# Patient Record
Sex: MALE | Race: ASIAN | NOT HISPANIC OR LATINO | URBAN - METROPOLITAN AREA
[De-identification: names, ages, dates, MRNs, and addresses within clinical notes are randomized per-mention and may not be internally consistent; named-entity substitution may affect disease eponyms.]

---

## 2017-04-20 ENCOUNTER — OFFICE VISIT (OUTPATIENT)
Dept: FAMILY MEDICINE | Facility: CLINIC | Age: 42
End: 2017-04-20

## 2017-04-20 VITALS
HEIGHT: 62 IN | HEART RATE: 81 BPM | TEMPERATURE: 98 F | WEIGHT: 111.6 LBS | SYSTOLIC BLOOD PRESSURE: 115 MMHG | BODY MASS INDEX: 20.54 KG/M2 | DIASTOLIC BLOOD PRESSURE: 74 MMHG

## 2017-04-20 DIAGNOSIS — G83.9: Primary | ICD-10-CM

## 2017-04-20 NOTE — MR AVS SNAPSHOT
"              After Visit Summary   4/20/2017    Saw Kenny Reed    MRN: [id number]           Patient Information     Date Of Birth          1975        Visit Information        Provider Department      4/20/2017 11:20 AM Scott Ellison MD [facility]        Today's Diagnoses     Flaccid paralysis (H)    -  1       Follow-ups after your visit        Who to contact     Please call your clinic at [phone] to:    Ask questions about your health    Make or cancel appointments    Discuss your medicines    Learn about your test results    Speak to your doctor   If you have compliments or concerns about an experience at your clinic, or if you wish to file a complaint, please contact [facility] Patient Relations at [phone] or email us at [email or web address]         Additional Information About Your Visit        MyChart Information     Pocket Changet gives you secure access to your electronic health record. If you see a primary care provider, you can also send messages to your care team and make appointments. If you have questions, please call your primary care clinic.  If you do not have a primary care provider, please call [phone] and they will assist you.      Heart Buddy is an electronic gateway that provides easy, online access to your medical records. With Heart Buddy, you can request a clinic appointment, read your test results, renew a prescription or communicate with your care team.     To access your existing account, please contact your [facility] or call [phone] for assistance.        Care EveryWhere ID     This is your Care EveryWhere ID. This could be used by other organizations to access your [place] medical records  [id number]        Your Vitals Were     Pulse Temperature Height BMI (Body Mass Index)          81 98  F (36.7  C) (Oral) 5' 1.81\" (157 cm) 20.54 kg/m2         Blood Pressure from Last 3 Encounters:   04/20/17 " 115/74   12/02/16 124/80    Weight from Last 3 Encounters:   04/20/17 111 lb 9.6 oz (50.6 kg)   12/02/16 113 lb 6.4 oz (51.4 kg)              Today, you had the following     No orders found for display         Today's Medication Changes          These changes are accurate as of: 4/20/17 11:59 AM.  If you have any questions, ask your nurse or doctor.               Start taking these medicines.        Dose/Directions    order for DME   Used for:  Flaccid paralysis (H)   Started by:  Scott Ellison MD        Equipment being ordered: forearm crutches   Quantity:  1 Units   Refills:  0            Where to get your medicines      Some of these will need a paper prescription and others can be bought over the counter.  Ask your nurse if you have questions.     Bring a paper prescription for each of these medications     order for DME                Primary Care Provider Office Phone # Fax #    Scott Ellison [phone]       [facility] [address] [place] [address]        Thank you!     Thank you for choosing [facility]  for your care. Our goal is always to provide you with excellent care. Hearing back from our patients is one way we can continue to improve our services. Please take a few minutes to complete the written survey that you may receive in the mail after your visit with us. Thank you!             Your Updated Medication List - Protect others around you: Learn how to safely use, store and throw away your medicines at www.disposemymeds.org.          This list is accurate as of: 4/20/17 11:59 AM.  Always use your most recent med list.                   Brand Name Dispense Instructions for use    acetaminophen 325 MG tablet    TYLENOL    100 tablet    Take 2 tablets (650 mg) by mouth every 4 hours as needed for mild pain       order for DME     1 Units    Equipment being ordered: forearm crutches

## 2017-04-20 NOTE — PROGRESS NOTES
"       SUBJECTIVE       Saw Kenny Reed is a 42 year old  male with a PMH significant for:     Patient Active Problem List   Diagnosis     History of poliomyelitis     Flaccid paralysis (H)     History of hepatitis B virus infection conferring immunity     He presents to clinic needing forms filled out for parking disability as well as new crutches. 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 He notes that his forearm crutches are worn down and are cutting into his arms at this time. He has recently acquired a drivers permit and is able to drive with only his left leg.     PMH, Medications and Allergies were reviewed and updated as needed.        REVIEW OF SYSTEMS     CONSTITUTIONAL: NEGATIVE for fever, chills, change in weight  RESP: NEGATIVE for significant cough or SOB  CV: NEGATIVE for chest pain, palpitations or peripheral edema        OBJECTIVE     Vitals:    04/20/17 1132   BP: 115/74   BP Location: Left arm   Patient Position: Chair   Pulse: 81   Temp: 98  F (36.7  C)   TempSrc: Oral   Weight: 111 lb 9.6 oz (50.6 kg)   Height: 5' 1.81\" (157 cm)     Body mass index is 20.54 kg/(m^2).    Constitutional: Awake, alert, cooperative, no apparent distress, and appears stated age.  Lungs: No increased work of breathing, good air exchange, clear to auscultation bilaterally, no crackles or wheezing.  Cardiovascular: Regular rate and rhythm, normal S1 and S2, no S3 or S4, and no murmur noted.  Musculoskeletal: normal strength and ROM of the left LE. Right leg shows severely atrophied calf and thigh muscles. Patient able to move toes, but unable to move any other musculature in the legs. Intact distal sensation bilaterally.           ASSESSMENT AND PLAN     1. Flaccid paralysis (H)  - order for DME; Equipment being ordered: forearm crutches  Dispense: 1 Units; Refill: 0  - Filled out long-term disability parking form for the patient as well.         Follow-up " as needed.     Scott Ellison MD PGY1  [facility]

## 2017-04-20 NOTE — PROGRESS NOTES
"Preceptor attestation:  Blood pressure 115/74, pulse 81, temperature 98  F (36.7  C), temperature source Oral, height 5' 1.81\" (157 cm), weight 111 lb 9.6 oz (50.6 kg).  Patient seen and discussed with the resident.  Assessment and plan reviewed with resident and agreed upon.  Supervising physician: Rony Wilson  [facility]  "

## 2017-04-25 ENCOUNTER — COMMUNICATION - HEALTHEAST (OUTPATIENT)
Dept: FAMILY MEDICINE | Facility: CLINIC | Age: 42
End: 2017-04-25

## 2017-04-26 ENCOUNTER — OFFICE VISIT - HEALTHEAST (OUTPATIENT)
Dept: FAMILY MEDICINE | Facility: CLINIC | Age: 42
End: 2017-04-26

## 2017-04-26 DIAGNOSIS — Z28.39 IMMUNIZATION DEFICIENCY: ICD-10-CM

## 2017-04-26 DIAGNOSIS — Z23 NEED FOR VACCINATION: ICD-10-CM

## 2017-04-26 ASSESSMENT — MIFFLIN-ST. JEOR: SCORE: 1267.28

## 2017-11-21 ENCOUNTER — ALLIED HEALTH/NURSE VISIT (OUTPATIENT)
Dept: FAMILY MEDICINE | Facility: CLINIC | Age: 42
End: 2017-11-21

## 2017-11-21 DIAGNOSIS — Z23 INFLUENZA VACCINE NEEDED: Primary | ICD-10-CM

## 2017-11-21 NOTE — MR AVS SNAPSHOT
After Visit Summary   11/21/2017    Saw Kenny Reed    MRN: [id number]           Patient Information     Date Of Birth          1975        Visit Information        Provider Department      11/21/2017 1:30 PM [facility]        Today's Diagnoses     Influenza vaccine needed    -  1       Follow-ups after your visit        Who to contact     Please call your clinic at [phone] to:    Ask questions about your health    Make or cancel appointments    Discuss your medicines    Learn about your test results    Speak to your doctor   If you have compliments or concerns about an experience at your clinic, or if you wish to file a complaint, please contact [facility] Patient Relations at [phone] or email us at [email or web address]         Additional Information About Your Visit        MyChart Information     Nykaat gives you secure access to your electronic health record. If you see a primary care provider, you can also send messages to your care team and make appointments. If you have questions, please call your primary care clinic.  If you do not have a primary care provider, please call [phone] and they will assist you.      Elixserve is an electronic gateway that provides easy, online access to your medical records. With Elixserve, you can request a clinic appointment, read your test results, renew a prescription or communicate with your care team.     To access your existing account, please contact your [facility] or call [phone] for assistance.        Care EveryWhere ID     This is your Care EveryWhere ID. This could be used by other organizations to access your [place] medical records  [id number]         Blood Pressure from Last 3 Encounters:   04/20/17 115/74   12/02/16 124/80    Weight from Last 3 Encounters:   04/20/17 111 lb 9.6 oz (50.6 kg)   12/02/16 113 lb 6.4 oz (51.4 kg)               We Performed the Following     ADMIN VACCINE, INITIAL     FLU VAC QUADRIVLENT SPLIT VIRUS IM 0.5ml dosage        Primary Care Provider Office Phone # Fax #    Scott Bjorn Ellison [phone]       [facility] [address] [place] [address]        Equal Access to Services     ALEKSANDRA KAPADIA : Hadii aad ku hadasho Soomaali, waaxda luqadaha, qaybta kaalmada adeegyada, waxay donnain haybryann juan carlos muirryannnoah thomson. So [facility] [phone].    ATENCIÓN: Si habla español, tiene a tobias disposición servicios gratuitos de asistencia lingüística. Fadyame al [phone].    We comply with applicable federal civil rights laws and [place] laws. We do not discriminate on the basis of race, color, national origin, age, disability, sex, sexual orientation, or gender identity.            Thank you!     Thank you for choosing [facility]  for your care. Our goal is always to provide you with excellent care. Hearing back from our patients is one way we can continue to improve our services. Please take a few minutes to complete the written survey that you may receive in the mail after your visit with us. Thank you!             Your Updated Medication List - Protect others around you: Learn how to safely use, store and throw away your medicines at www.disposemymeds.org.          This list is accurate as of: 11/21/17  2:45 PM.  Always use your most recent med list.                   Brand Name Dispense Instructions for use Diagnosis    acetaminophen 325 MG tablet    TYLENOL    100 tablet    Take 2 tablets (650 mg) by mouth every 4 hours as needed for mild pain    Episodic tension-type headache, not intractable       order for DME     1 Units    Equipment being ordered: forearm crutches    Flaccid paralysis (H)

## 2017-11-21 NOTE — NURSING NOTE
"Injectable Influenza Immunization Documentation    1.  Has the patient received the information for the injectable influenza vaccine? YES     2. Is the patient 6 months of age or older? YES     3. Does the patient have any of the following contraindications?         Severe allergy to eggs? No     Severe allergic reaction to previous influenza vaccines? No   Severe allergy to latex? No       History of Guillain-[place] syndrome? No     Currently have a temperature greater than 100.4F? No        4.  Severely egg allergic patients should have flu vaccine eligibility assessed by an MD, RN, or pharmacist, and those who received flu vaccine should be observed for 15 min by an MD, RN, Pharmacist, Medical Technician, or member of clinic staff.\": YES    5. Latex-allergic patients should be given latex-free influenza vaccine Yes. Please reference the Vaccine latex table to determine if your clinic s product is latex-containing.       Vaccination given by Jeana Taveras CMA          "

## 2018-03-13 ENCOUNTER — OFFICE VISIT (OUTPATIENT)
Dept: FAMILY MEDICINE | Facility: CLINIC | Age: 43
End: 2018-03-13
Payer: COMMERCIAL

## 2018-03-13 VITALS
DIASTOLIC BLOOD PRESSURE: 78 MMHG | TEMPERATURE: 98 F | HEART RATE: 86 BPM | BODY MASS INDEX: 23 KG/M2 | WEIGHT: 125 LBS | OXYGEN SATURATION: 95 % | SYSTOLIC BLOOD PRESSURE: 115 MMHG

## 2018-03-13 DIAGNOSIS — R10.31 ABDOMINAL PAIN, RIGHT LOWER QUADRANT: Primary | ICD-10-CM

## 2018-03-13 DIAGNOSIS — G44.219 EPISODIC TENSION-TYPE HEADACHE, NOT INTRACTABLE: ICD-10-CM

## 2018-03-13 LAB
% GRANULOCYTES: 73.7 %G (ref 40–75)
BACTERIA: NORMAL
BILIRUBIN UR: NEGATIVE
BLOOD UR: ABNORMAL
BUN SERPL-MCNC: 15.3 MG/DL (ref 7–21)
CALCIUM SERPL-MCNC: 9.7 MG/DL (ref 8.5–10.1)
CASTS: NORMAL /LPF
CHLORIDE SERPLBLD-SCNC: 99.9 MMOL/L (ref 98–110)
CO2 SERPL-SCNC: 33 MMOL/L (ref 20–32)
CREAT SERPL-MCNC: 0.9 MG/DL (ref 0.7–1.3)
CRYSTAL URINE: NORMAL /LPF
EPITHELIAL CELLS UR: NORMAL /LPF (ref 0–2)
GFR SERPL CREATININE-BSD FRML MDRD: >90 ML/MIN/1.7 M2
GLUCOSE SERPL-MCNC: 116.7 MG'DL (ref 70–99)
GLUCOSE URINE: NEGATIVE
GRANULOCYTES #: 6.4 K/UL (ref 1.6–8.3)
HCT VFR BLD AUTO: 49.8 % (ref 40–53)
HEMOGLOBIN: 15.2 G/DL (ref 13.3–17.7)
KETONES UR QL: NEGATIVE
LEUKOCYTE ESTERASE UR: NEGATIVE
LYMPHOCYTES # BLD AUTO: 1.7 K/UL (ref 0.8–5.3)
LYMPHOCYTES NFR BLD AUTO: 19.9 %L (ref 20–48)
MCH RBC QN AUTO: 27.7 PG (ref 26.5–35)
MCHC RBC AUTO-ENTMCNC: 30.5 G/DL (ref 32–36)
MCV RBC AUTO: 90.8 FL (ref 78–100)
MID #: 0.6 K/UL (ref 0–2.2)
MID %: 6.4 %M (ref 0–20)
MUCOUS URINE: NORMAL LPF
NITRITE UR QL STRIP: NEGATIVE
PH UR STRIP: 7 [PH] (ref 5–7)
PLATELET # BLD AUTO: 162 K/UL (ref 150–450)
POTASSIUM SERPL-SCNC: 3.8 MMOL/DL (ref 3.2–4.6)
PROTEIN UR: NEGATIVE
RBC # BLD AUTO: 5.5 M/UL (ref 4.4–5.9)
RBC URINE: NORMAL /HPF
SODIUM SERPL-SCNC: 137.6 MMOL/L (ref 132–142)
SP GR UR STRIP: 1.01
UROBILINOGEN UR STRIP-ACNC: ABNORMAL
WBC # BLD AUTO: 8.7 K/UL (ref 4–11)
WBC URINE: NORMAL /HPF

## 2018-03-13 RX ORDER — ACETAMINOPHEN 325 MG/1
650 TABLET ORAL EVERY 4 HOURS PRN
Qty: 100 TABLET | Refills: 0 | Status: SHIPPED | OUTPATIENT
Start: 2018-03-13 | End: 2018-09-12

## 2018-03-13 RX ORDER — IBUPROFEN 400 MG/1
400 TABLET, FILM COATED ORAL EVERY 6 HOURS PRN
Qty: 120 TABLET | Refills: 0 | Status: SHIPPED | OUTPATIENT
Start: 2018-03-13 | End: 2019-02-22

## 2018-03-13 NOTE — PATIENT INSTRUCTIONS
Schedule CT scan  Stop in lab--blood and urine   tylenol and ibuprofen to use for pain.      CT ABDOMEN/PELVIS:  [facility]  [address]  [address]  [place] [address]  [phone]  Date:   Friday March 16, 2018  Time:   11:00 AM  Nothing to eat or drink  3 hours prior to appointment.   has been requested for this appointment.  Transportation will  at 10:00 AM  [employer]  [phone]  Given to Lilia Ruano ()  Anastasiia Luis  3/13/18
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
## 2018-03-13 NOTE — MR AVS SNAPSHOT
After Visit Summary   3/13/2018    Saw Kenny Reed    MRN: [id number]           Patient Information     Date Of Birth          1975        Visit Information        Provider Department      3/13/2018 10:40 AM Lisha Motley MD [facility]        Today's Diagnoses     Abdominal pain, right lower quadrant    -  1    Episodic tension-type headache, not intractable          Care Instructions    Schedule CT scan  Stop in lab--blood and urine   tylenol and ibuprofen to use for pain.              Follow-ups after your visit        Future tests that were ordered for you today     Open Future Orders        Priority Expected Expires Ordered    CT ABDOMEN PELVIS W CONTRAST STAT  3/13/2019 3/13/2018            Who to contact     Please call your clinic at [phone] to:    Ask questions about your health    Make or cancel appointments    Discuss your medicines    Learn about your test results    Speak to your doctor            Additional Information About Your Visit        MyChart Information     AppSlingr gives you secure access to your electronic health record. If you see a primary care provider, you can also send messages to your care team and make appointments. If you have questions, please call your primary care clinic.  If you do not have a primary care provider, please call [phone] and they will assist you.      AppSlingr is an electronic gateway that provides easy, online access to your medical records. With AppSlingr, you can request a clinic appointment, read your test results, renew a prescription or communicate with your care team.     To access your existing account, please contact your [facility] or call [phone] for assistance.        Care EveryWhere ID     This is your Care EveryWhere ID. This could be used by other organizations to access your [place] medical records  [id number]        Your Vitals Were     Pulse Temperature Pulse Oximetry BMI  (Body Mass Index)          86 98  F (36.7  C) (Oral) 95% 23 kg/m2         Blood Pressure from Last 3 Encounters:   03/13/18 115/78   04/20/17 115/74   12/02/16 124/80    Weight from Last 3 Encounters:   03/13/18 125 lb (56.7 kg)   04/20/17 111 lb 9.6 oz (50.6 kg)   12/02/16 113 lb 6.4 oz (51.4 kg)              We Performed the Following     Basic Metabolic Panel ([place])     CBC with Diff Plt ([facility])     Chlamydia/Gono Amplified ([facility])     Urinalysis, Micro If ([facility])     Urine Culture ([facility])          Today's Medication Changes          These changes are accurate as of 3/13/18 11:17 AM.  If you have any questions, ask your nurse or doctor.               Start taking these medicines.        Dose/Directions    ibuprofen 400 MG tablet   Commonly known as:  ADVIL/MOTRIN   Used for:  Abdominal pain, right lower quadrant   Started by:  Lisha Motley MD        Dose:  400 mg   Take 1 tablet (400 mg) by mouth every 6 hours as needed for moderate pain   Quantity:  120 tablet   Refills:  0            Where to get your medicines      These medications were sent to [facility] - [place], [place] - [address], [place] [address]     Phone:  [phone]     acetaminophen 325 MG tablet    ibuprofen 400 MG tablet                Primary Care Provider Office Phone # Fax #    Scott Bjorn Ellison [phone]       [facility] [address] [place] [address]        Equal Access to Services     ALEKSANDRA KAPADIA AH: Hadii afshin ku hadasho Soomaali, waaxda luqadaha, qaybta kaalmada adeegyada, tona thomson. So [facility] [phone].    ATENCIÓN: Si habla boubacar, tiene a tobias disposición servicios gratuitos de asistencia lingüística. Llame al [phone].    We comply with applicable federal civil rights laws and [place] laws. We do not discriminate on the basis of race, color, national origin, age, disability, sex, sexual orientation,  or gender identity.            Thank you!     Thank you for choosing [facility]  for your care. Our goal is always to provide you with excellent care. Hearing back from our patients is one way we can continue to improve our services. Please take a few minutes to complete the written survey that you may receive in the mail after your visit with us. Thank you!             Your Updated Medication List - Protect others around you: Learn how to safely use, store and throw away your medicines at www.disposemymeds.org.          This list is accurate as of 3/13/18 11:17 AM.  Always use your most recent med list.                   Brand Name Dispense Instructions for use Diagnosis    acetaminophen 325 MG tablet    TYLENOL    100 tablet    Take 2 tablets (650 mg) by mouth every 4 hours as needed for mild pain    Episodic tension-type headache, not intractable       ibuprofen 400 MG tablet    ADVIL/MOTRIN    120 tablet    Take 1 tablet (400 mg) by mouth every 6 hours as needed for moderate pain    Abdominal pain, right lower quadrant       order for DME     1 Units    Equipment being ordered: forearm crutches    Flaccid paralysis (H)

## 2018-03-13 NOTE — LETTER
March 16, 2018      Saw Kenny Reed  [address]   [place] [address]        Dear Saw,  Here are the rest of your lab results.  Everything looks good.  Please make sure to follow up in clinic next week to discuss your CT scan and make sure that pain is getting better.  Please call the clinic at [phone] if you have any questions.     Please see below for your test results.    Resulted Orders   CBC with Diff Plt ([facility])   Result Value Ref Range    WBC 8.7 4.0 - 11.0 K/uL    Lymphocytes # 1.7 0.8 - 5.3 K/uL    % Lymphocytes 19.9 (L) 20.0 - 48.0 %L    Mid # 0.6 0.0 - 2.2 K/uL    Mid % 6.4 0.0 - 20.0 %M    GRANULOCYTES # 6.4 1.6 - 8.3 K/uL    % Granulocytes 73.7 40.0 - 75.0 %G    RBC 5.5 4.4 - 5.9 M/uL    Hemoglobin 15.2 13.3 - 17.7 g/dL    Hematocrit 49.8 40.0 - 53.0 %    MCV 90.8 78.0 - 100.0 fL    MCH 27.7 26.5 - 35.0    MCHC 30.5 (L) 32.0 - 36.0 g/dL    Platelets 162.0 150.0 - 450.0 K/uL   Basic Metabolic Panel ([facility])   Result Value Ref Range    Urea Nitrogen 15.3 7.0 - 21.0 mg/dL    Calcium 9.7 8.5 - 10.1 mg/dL    Chloride 99.9 98.0 - 110.0 mmol/L    Carbon Dioxide 33.0 (H) 20.0 - 32.0 mmol/L    Creatinine 0.9 0.7 - 1.3 mg/dL    Glucose 116.7 (H) 70.0 - 99.0 mg'dL    Potassium 3.8 3.2 - 4.6 mmol/dL    Sodium 137.6 132.0 - 142.0 mmol/L    GFR Estimate >90 >60.0 mL/min/1.7 m2    GFR Estimate If Black >90 >60.0 mL/min/1.7 m2   Urinalysis, Micro If ([facility])   Result Value Ref Range    Specific Gravity Urine 1.015 1.005 - 1.030    pH Urine 7.0 4.5 - 8.0    Leukocyte Esterase UR Negative NEGATIVE    Nitrite Urine Negative NEGATIVE    Protein UR Negative NEGATIVE    Glucose Urine Negative NEGATIVE    Ketones Urine Negative NEGATIVE    Urobilinogen mg/dL 0.2 E.U./dL 0.2 E.U./dL    Bilirubin UR Negative NEGATIVE    Blood UR Trace-intact (A) NEGATIVE   Urine Culture ([facility])   Result Value Ref Range    Culture SEE RESULTS BELOW       Comment:      CULTURE, URINE   SOURCE: Urine, Voided   CULTURE  RESULTS:    No Growth      Narrative    Test performed by:  [facility]  [address] [place] [address]   Chlamydia/Gono Amplified ("[employer]   Result Value Ref Range    Chlamydia trac,Amplified Prb Negative Negative    N gonorrhoeae,Amplified Prb Negative Negative    Narrative    Test performed by:  [facility] [address] [place] [address]   Urine Microscopic ([facility])   Result Value Ref Range    WBC Urine None <5 /hpf    RBC Urine None <5 /hpf    Epithelial Cells UR None 0 - 2 /lpf    Mucous Urine None NONE lpf    Casts Urine None NONE /lpf    Crystal Urine None NONE /lpf    Bacteria Wet Prep None None       If you have any questions, please call the clinic to make an appointment.    Sincerely,    Lisha Motley MD

## 2018-03-14 LAB
C TRACH RRNA SPEC QL NAA+PROBE: NEGATIVE
CULTURE: NORMAL
N GONORRHOEA RRNA SPEC QL NAA+PROBE: NEGATIVE

## 2018-03-14 NOTE — PROGRESS NOTES
Saw Kenny Derek-    Your initial tests look good--no sign of infection in the blood or urine.  I see that your CT scan is not until Friday.  If you start developing worsening pain, fevers, or vomiting, please return to clinic so we can do this testing sooner.      Lisha Pickett--can you have an --or use the phone interpretation to call this patient and check in on how he is doing?  I am worried that he has appendicitis--and in that case, waiting until Friday to do the CT scan is NOT a good idea--it could easily rupture in that time.  Can you see if his pain is better/worse/same today, and if it is getting worse, have him come back in for a visit?  Thank you!

## 2018-03-15 NOTE — PROGRESS NOTES
Baldomero Reed-    Here are the rest of your lab results.  Everything looks good.  Please make sure to follow up in clinic next week to discuss your CT scan and make sure that pain is getting better.  Please call the clinic at [phone] if you have any questions.      Lisha Motley    Please send results to patient.

## 2018-03-16 ENCOUNTER — TRANSFERRED RECORDS (OUTPATIENT)
Dept: HEALTH INFORMATION MANAGEMENT | Facility: CLINIC | Age: 43
End: 2018-03-16

## 2018-03-16 ENCOUNTER — SURGERY - HEALTHEAST (OUTPATIENT)
Dept: SURGERY | Facility: HOSPITAL | Age: 43
End: 2018-03-16

## 2018-03-16 ENCOUNTER — TELEPHONE (OUTPATIENT)
Dept: FAMILY MEDICINE | Facility: CLINIC | Age: 43
End: 2018-03-16

## 2018-03-16 ENCOUNTER — ANESTHESIA - HEALTHEAST (OUTPATIENT)
Dept: SURGERY | Facility: HOSPITAL | Age: 43
End: 2018-03-16

## 2018-03-16 DIAGNOSIS — R10.31 ABDOMINAL PAIN, RIGHT LOWER QUADRANT: ICD-10-CM

## 2018-03-16 DIAGNOSIS — K35.30 ACUTE APPENDICITIS WITH LOCALIZED PERITONITIS: Primary | ICD-10-CM

## 2018-03-16 ASSESSMENT — MIFFLIN-ST. JEOR
SCORE: 1303.57
SCORE: 1344.39

## 2018-03-16 NOTE — TELEPHONE ENCOUNTER
Called [facility] informing them that patient was on his way.    Scott Ellison MD PGY2  [facility]

## 2018-03-16 NOTE — TELEPHONE ENCOUNTER
Received call from [facility] regarding Saw Kenny Reed. They informed me that patient had CT scan that was positive for acute appendicitis. I will have November call the patient to inform them of the diagnosis and the need for evaluation in the ED today regarding his appendicitis.       Scott Ellison MD PGY2  [facility]

## 2018-03-16 NOTE — TELEPHONE ENCOUNTER
Patient was called and notified.  He will ask somebody to take him to [place]. BRYANNA Herrera

## 2018-03-22 ENCOUNTER — OFFICE VISIT (OUTPATIENT)
Dept: FAMILY MEDICINE | Facility: CLINIC | Age: 43
End: 2018-03-22
Payer: COMMERCIAL

## 2018-03-22 VITALS
DIASTOLIC BLOOD PRESSURE: 83 MMHG | TEMPERATURE: 97.9 F | RESPIRATION RATE: 16 BRPM | HEART RATE: 91 BPM | OXYGEN SATURATION: 98 % | SYSTOLIC BLOOD PRESSURE: 121 MMHG

## 2018-03-22 DIAGNOSIS — Z09 POSTOPERATIVE FOLLOW-UP: Primary | ICD-10-CM

## 2018-03-22 RX ORDER — IBUPROFEN 400 MG/1
400 TABLET, FILM COATED ORAL
COMMUNITY
Start: 2018-03-13 | End: 2019-02-22

## 2018-03-22 RX ORDER — ACETAMINOPHEN 325 MG/1
TABLET ORAL
COMMUNITY
Start: 2018-03-17 | End: 2018-12-12 | Stop reason: ALTCHOICE

## 2018-03-22 RX ORDER — HYDROCODONE BITARTRATE AND ACETAMINOPHEN 5; 325 MG/1; MG/1
1-2 TABLET ORAL
COMMUNITY
Start: 2018-03-17 | End: 2019-02-22

## 2018-03-22 RX ORDER — AMOXICILLIN 250 MG
1 CAPSULE ORAL
COMMUNITY
Start: 2018-03-17 | End: 2019-02-22

## 2018-03-22 NOTE — MR AVS SNAPSHOT
After Visit Summary   3/22/2018    Saw Kenny Reed    MRN: [id number]           Patient Information     Date Of Birth          1975        Visit Information        Provider Department      3/22/2018 8:20 AM Joshua Cole MD [facility]        Today's Diagnoses     Postoperative follow-up    -  1      Care Instructions    Okay to start bathing or showering    Make appointment to see Surgeon by the end of next week              Follow-ups after your visit        Who to contact     Please call your clinic at [phone] to:    Ask questions about your health    Make or cancel appointments    Discuss your medicines    Learn about your test results    Speak to your doctor            Additional Information About Your Visit        [employer] Information     [employer] gives you secure access to your electronic health record. If you see a primary care provider, you can also send messages to your care team and make appointments. If you have questions, please call your primary care clinic.  If you do not have a primary care provider, please call [phone] and they will assist you.      [employer] is an electronic gateway that provides easy, online access to your medical records. With [employer], you can request a clinic appointment, read your test results, renew a prescription or communicate with your care team.     To access your existing account, please contact your [facility] or call [phone] for assistance.        Care EveryWhere ID     This is your Care EveryWhere ID. This could be used by other organizations to access your [place] medical records  [id number]        Your Vitals Were     Pulse Temperature Respirations Pulse Oximetry          91 97.9  F (36.6  C) (Oral) 16 98%         Blood Pressure from Last 3 Encounters:   03/22/18 121/83   03/13/18 115/78   04/20/17 115/74    Weight from Last 3 Encounters:   03/13/18 125 lb (56.7 kg)   04/20/17 111 lb 9.6 oz  (50.6 kg)   12/02/16 113 lb 6.4 oz (51.4 kg)              Today, you had the following     No orders found for display       Primary Care Provider Office Phone # Fax #    Scott Bjorn Ellison [phone]       [facility] [address] [place] [address]        Equal Access to Services     ALEKSANDRA KAPADIA : Hadii aad ku hadasho Soomaali, waaxda luqadaha, qaybta kaalmada adeegyada, waxay donnain hayaan adeeg kharash laderrickn . So [facility] [phone].    ATENCIÓN: Si habla español, tiene a tobias disposición servicios gratuitos de asistencia lingüística. Fadyame al [phone].    We comply with applicable federal civil rights laws and [place] laws. We do not discriminate on the basis of race, color, national origin, age, disability, sex, sexual orientation, or gender identity.            Thank you!     Thank you for choosing [facility]  for your care. Our goal is always to provide you with excellent care. Hearing back from our patients is one way we can continue to improve our services. Please take a few minutes to complete the written survey that you may receive in the mail after your visit with us. Thank you!             Your Updated Medication List - Protect others around you: Learn how to safely use, store and throw away your medicines at www.disposemymeds.org.          This list is accurate as of 3/22/18  8:55 AM.  Always use your most recent med list.                   Brand Name Dispense Instructions for use Diagnosis    * acetaminophen 325 MG tablet    TYLENOL    100 tablet    Take 2 tablets (650 mg) by mouth every 4 hours as needed for mild pain    Episodic tension-type headache, not intractable       * PHARBETOL 325 MG tablet   Generic drug:  acetaminophen      Take 1-2 tablets as needed for pain. Do not take this in addition to Vicodin/Norco as that also has tylenol in it.        HYDROcodone-acetaminophen 5-325 MG per tablet    NORCO     Take 1-2 tablets by mouth        * ibuprofen  400 MG tablet    ADVIL/MOTRIN    120 tablet    Take 1 tablet (400 mg) by mouth every 6 hours as needed for moderate pain    Abdominal pain, right lower quadrant       * ibuprofen 400 MG tablet    ADVIL/MOTRIN     Take 400 mg by mouth        order for DME     1 Units    Equipment being ordered: forearm crutches    Flaccid paralysis (H)       senna-docusate 8.6-50 MG per tablet    SENOKOT-S;PERICOLACE     Take 1 tablet by mouth        * Notice:  This list has 4 medication(s) that are the same as other medications prescribed for you. Read the directions carefully, and ask your doctor or other care provider to review them with you.

## 2018-03-22 NOTE — PROGRESS NOTES
SUBJECTIVE       Saw Kenny Reed is a 43 year old  male with a PMH significant for:     Patient Active Problem List   Diagnosis     History of poliomyelitis     Flaccid paralysis (H)     History of hepatitis B virus infection conferring immunity     Patient presents with:  Follow Up For: Pt is here to follow up from having his appendix removed.   Medication Reconciliation: Complete.   Patient denies fevers. Appetite returned and having no complications such as vomiting or diarrhea. Takes Percocet about 1-2x/day for pain, the most aggravating factor is ambulating (uses forearm Crutches for his condition of Polio)  3/16 [facility] underwent appendectomy and was discharged 3/17 with #20 of Percocet.     Has yet to make his 2 week follow-up with surgery.   Surgical bandages are still in tact (3 of them) and he has yet to shower/bathe since discharge.     Cecilia speaking  facilitated this visit.     PMH, Medications and Allergies were reviewed and updated as needed.    ROS: As above per HPI        OBJECTIVE     Vitals:    03/22/18 0840   BP: 121/83   BP Location: Left arm   Patient Position: Sitting   Cuff Size: Adult Regular   Pulse: 91   Resp: 16   Temp: 97.9  F (36.6  C)   TempSrc: Oral   SpO2: 98%     There is no height or weight on file to calculate BMI.    GEN: NAD, appears stated age, good hygiene,   CV: RRR no m/r/g, s1 and s2 noted  NECK: supple, no obvious masses  HEENT: EOMI, head normocephalic, sclera anicteric, trachea midline  PULM: clear bilaterally without wheezes/rhonchi/rales, non-labored  ABD: soft, non-tender, + BS in all quadrants, 3 bandages covering trocar sites without surrounding erythema and without tenderness.   NEURO: CN II-XII intact  GAIT: requires forearm crutches    LABS/IMAGING/EKG  No results found for this or any previous visit (from the past 24 hour(s)).    ASSESSMENT AND PLAN     Postoperative follow-up Appendectomy  Normal exam. Healing well and pain managed  appropriately. Okay to continue Percocet PRN for pain. Follow-up with Surgery as recommended 2 weeks from discharge.     RTC as needed      Joshua Cole MD PGY3  [facility]    Discussed with Dr. Reed Lam who agrees with the above assessment and plan.

## 2018-03-22 NOTE — NURSING NOTE
name: Lilia Ruano  Language: Cecilia  Agency: [facility]  Phone number: [phone]

## 2018-03-22 NOTE — PATIENT INSTRUCTIONS
Okay to start bathing or showering    Make appointment to see Surgeon by the end of next week      POST-OP APPOINTMENT:  [facility]  [address]  [place] [address]  [phone]  Date:   Friday March 30, 2018  Time:   10:00 AM with Dr. Hernández   has been requested for this appointment.  [facility] will  at 9:00 AM  [phone]  Given to Lilia Ruano ()  Anastasiia Luis  3/22/18

## 2018-03-22 NOTE — PROGRESS NOTES
Preceptor attestation:  Patient seen and discussed with the resident. Assessment and plan reviewed with resident and agreed upon.  Supervising physician: Reed Lam  [facility]

## 2018-03-30 ENCOUNTER — OFFICE VISIT - HEALTHEAST (OUTPATIENT)
Dept: SURGERY | Facility: CLINIC | Age: 43
End: 2018-03-30

## 2018-03-30 DIAGNOSIS — Z48.89 POSTOPERATIVE VISIT: ICD-10-CM

## 2018-03-30 ASSESSMENT — MIFFLIN-ST. JEOR: SCORE: 1344.39

## 2018-04-12 ENCOUNTER — OFFICE VISIT (OUTPATIENT)
Dept: FAMILY MEDICINE | Facility: CLINIC | Age: 43
End: 2018-04-12
Payer: COMMERCIAL

## 2018-04-12 VITALS
TEMPERATURE: 97.5 F | HEART RATE: 87 BPM | DIASTOLIC BLOOD PRESSURE: 87 MMHG | OXYGEN SATURATION: 97 % | BODY MASS INDEX: 22.08 KG/M2 | RESPIRATION RATE: 20 BRPM | SYSTOLIC BLOOD PRESSURE: 129 MMHG | WEIGHT: 120 LBS

## 2018-04-12 DIAGNOSIS — K59.00 CONSTIPATION, UNSPECIFIED CONSTIPATION TYPE: Primary | ICD-10-CM

## 2018-04-12 DIAGNOSIS — R03.0 ELEVATED BP WITHOUT DIAGNOSIS OF HYPERTENSION: ICD-10-CM

## 2018-04-12 RX ORDER — POLYETHYLENE GLYCOL 3350 17 G/17G
1 POWDER, FOR SOLUTION ORAL DAILY
Qty: 510 G | Refills: 11 | Status: SHIPPED | OUTPATIENT
Start: 2018-04-12 | End: 2019-02-22

## 2018-04-12 NOTE — NURSING NOTE
name: Lilia Ruano  Language: Cecilia  Agency: [facility]  Phone number: [phone]

## 2018-04-12 NOTE — MR AVS SNAPSHOT
After Visit Summary   4/12/2018    Saw Kenny Reed    MRN: [id number]           Patient Information     Date Of Birth          1975        Visit Information        Provider Department      4/12/2018 10:40 AM Brice Ocampo MD [facility]        Today's Diagnoses     Constipation, unspecified constipation type    -  1    Elevated BP without diagnosis of hypertension          Care Instructions    I will prescribe you a powder to take to help with your constipation. You can use 1 capful in a glass of water everyday. If it doesn't help then try.   2 capfuls    I will give you a rx for a blood pressure machine. Please rest at least 15 minutes before checking your blood pressure. The number should be 140/90    Return to clinic as needed or sooner if yours symptoms worsens          Follow-ups after your visit        Who to contact     Please call your clinic at [phone] to:    Ask questions about your health    Make or cancel appointments    Discuss your medicines    Learn about your test results    Speak to your doctor            Additional Information About Your Visit        [employer] Information     [employer] gives you secure access to your electronic health record. If you see a primary care provider, you can also send messages to your care team and make appointments. If you have questions, please call your primary care clinic.  If you do not have a primary care provider, please call [phone] and they will assist you.      [employer] is an electronic gateway that provides easy, online access to your medical records. With [employer], you can request a clinic appointment, read your test results, renew a prescription or communicate with your care team.     To access your existing account, please contact your [facility] or call [phone] for assistance.        Care EveryWhere ID     This is your Care EveryWhere ID. This could be used by other organizations to access  your [place] medical records  [id number]        Your Vitals Were     Pulse Temperature Respirations Pulse Oximetry BMI (Body Mass Index)       87 97.5  F (36.4  C) (Oral) 20 97% 22.08 kg/m2        Blood Pressure from Last 3 Encounters:   04/12/18 129/87   03/22/18 121/83   03/13/18 115/78    Weight from Last 3 Encounters:   04/12/18 120 lb (54.4 kg)   03/13/18 125 lb (56.7 kg)   04/20/17 111 lb 9.6 oz (50.6 kg)              Today, you had the following     No orders found for display       Primary Care Provider Office Phone # Fax #    Scott Bjorn Ellison [phone]       [facility] [address] [place] [address]        Equal Access to Services     ALEKSANDRA KAPADIA : Celine rios [place], waaxda luqadaha, qaybta kaalmada rodrigo, tona wright . So [facility] [phone].    ATENCIÓN: Si habla español, tiene a tobias disposición servicios gratuitos de asistencia lingüística. Elva al [phone].    We comply with applicable federal civil rights laws and [place] laws. We do not discriminate on the basis of race, color, national origin, age, disability, sex, sexual orientation, or gender identity.            Thank you!     Thank you for choosing [facility]  for your care. Our goal is always to provide you with excellent care. Hearing back from our patients is one way we can continue to improve our services. Please take a few minutes to complete the written survey that you may receive in the mail after your visit with us. Thank you!             Your Updated Medication List - Protect others around you: Learn how to safely use, store and throw away your medicines at www.disposemymeds.org.          This list is accurate as of 4/12/18 11:02 AM.  Always use your most recent med list.                   Brand Name Dispense Instructions for use Diagnosis    * acetaminophen 325 MG tablet    TYLENOL    100 tablet    Take 2 tablets (650 mg) by mouth every 4  hours as needed for mild pain    Episodic tension-type headache, not intractable       * PHARBETOL 325 MG tablet   Generic drug:  acetaminophen      Take 1-2 tablets as needed for pain. Do not take this in addition to Vicodin/Norco as that also has tylenol in it.        HYDROcodone-acetaminophen 5-325 MG per tablet    NORCO     Take 1-2 tablets by mouth        * ibuprofen 400 MG tablet    ADVIL/MOTRIN    120 tablet    Take 1 tablet (400 mg) by mouth every 6 hours as needed for moderate pain    Abdominal pain, right lower quadrant       * ibuprofen 400 MG tablet    ADVIL/MOTRIN     Take 400 mg by mouth        order for DME     1 Units    Equipment being ordered: forearm crutches    Flaccid paralysis (H)       senna-docusate 8.6-50 MG per tablet    SENOKOT-S;PERICOLACE     Take 1 tablet by mouth        * Notice:  This list has 4 medication(s) that are the same as other medications prescribed for you. Read the directions carefully, and ask your doctor or other care provider to review them with you.

## 2018-04-12 NOTE — PATIENT INSTRUCTIONS
I will prescribe you a powder to take to help with your constipation. You can use 1 capful in a glass of water everyday. If it doesn't help then try.   2 capfuls    I will give you a rx for a blood pressure machine. Please rest at least 5 minutes before checking your blood pressure. The number should be less than 140/90    Return to clinic as needed or sooner if your symptoms worsens

## 2018-04-12 NOTE — PROGRESS NOTES
Nursing Notes:   Kalin Argueta, [facility]  4/12/2018 10:52 AM  Signed   name: Lilia Ruano  Language: Cecilia  Agency: [employer]  Phone number: [phone]    Chief Complaint   Patient presents with     Constipation     x 1 week, did have a BM this morning, but it has been 3 days. Wants to start on some medication to help with this     Blood pressure 129/87, pulse 87, temperature 97.5  F (36.4  C), temperature source Oral, resp. rate 20, weight 120 lb (54.4 kg), SpO2 97 %.    Patient comes in today with 2 complaints.    The first is that he is constipated.  He develops abdominal pain associated with hard bowel movements.  He has a bowel movement every 2-3 days.  The bowel movements are hard.  He does not have blood in his stools associated with this.  He has used Senexon 8.6/50 in the past with good results.  No vomiting    He notes that he took his blood pressure recently, and the SBP was 150.  He does not have symptoms with this, including no chest pain, headache, or shortness of breath.    Objective:  This is a WN male in NAD.  Vitals are as noted above, and are wnl.  His abdomen is soft with positive bowel sounds.  There is no tenderness, guarding, or rebound.  There is no distention.    Assessment: #1 Constipation #2 elevated blood pressure without diagnosis of hypertension      Plan: The patient would like to use MiraLAX for his constipation.  I have discussed self titration with him.  For the elevated blood pressure, he will get a blood pressure monitor.  He will return to clinic with the values if they are >140 or >90    Constipation, unspecified constipation type  -     polyethylene glycol (MIRALAX) powder; Take 17 g (1 capful) by mouth daily    Elevated BP without diagnosis of hypertension  -     order for DME; Equipment being ordered: Blood pressure monitor    The patient was actively involved in the decision making process, and all the questions were answered to their satisfaction prior to leaving.   An   is present throughout the visit

## 2018-09-12 ENCOUNTER — OFFICE VISIT (OUTPATIENT)
Dept: FAMILY MEDICINE | Facility: CLINIC | Age: 43
End: 2018-09-12
Payer: COMMERCIAL

## 2018-09-12 VITALS
TEMPERATURE: 98 F | HEART RATE: 87 BPM | RESPIRATION RATE: 20 BRPM | SYSTOLIC BLOOD PRESSURE: 127 MMHG | OXYGEN SATURATION: 98 % | DIASTOLIC BLOOD PRESSURE: 85 MMHG

## 2018-09-12 DIAGNOSIS — G83.9: ICD-10-CM

## 2018-09-12 DIAGNOSIS — H57.12: ICD-10-CM

## 2018-09-12 DIAGNOSIS — H52.00 HYPERMETROPIA, UNSPECIFIED LATERALITY: Primary | ICD-10-CM

## 2018-09-12 RX ORDER — ACETAMINOPHEN 325 MG/1
650 TABLET ORAL EVERY 4 HOURS PRN
Qty: 100 TABLET | Refills: 0 | Status: SHIPPED | OUTPATIENT
Start: 2018-09-12 | End: 2018-12-12

## 2018-09-12 NOTE — NURSING NOTE
Due to patient being non-English speaking/uses sign language, an  was used for this visit. Only for face-to-face interpretation by an external agency, date and length of interpretation can be found on the scanned worksheet.     name: Baldomero Hernández  Agency: [employer]  Language: Cecilia   Telephone number: Baldomero Deni Hernández  Type of interpretation: Face-to-face, spoken    VISION:  Far vision: Right eye 10/10, Left eye 10/16, with no corrective lens  Plus lens (5 years and older who pass distance screening and do not have corrective lens):  Pass - blurred vision    Katie Argueta MA

## 2018-09-12 NOTE — PATIENT INSTRUCTIONS
Baldomero Reed,    -Referral to Eye Doctor ordered today.  -Please return to clinic if you have a new rash, fever or changes in your vision or if your symptoms worsen or do not resolve in the next 2 weeks.    Dr. Dumont    OPHTHALMOLOGY ADULT REFERRAL  September 13, 2018 at 9:10 am left a message for  Baldomero Hernández to see if there is a day/time that patient cannot go? Preferred location? Does he have/need ride to appointment? [facility]    OPHTHALMOLOGY ADULT REFERRAL  [facility]           [address] [place] [address]  [id number]    Appointment:  Wednesday September 19, 2018  Arrival Time:  3:30 pm   Provider:  Dr. Bower    Please bring a copy of your insurance card and photo ID    If you wear contacts or glasses please bring these with you to your appointment     If you cannot make this appointment, please contact [phone] to reschedule    [employer] scheduled for pickup at 2:30 pm from     [address]  [place] [address]    [employer] [place] [phone]    September 14, 2018 at 1:55 pm will relay message to  Baldomero Hernández to give to patient
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
## 2018-09-12 NOTE — MR AVS SNAPSHOT
After Visit Summary   9/12/2018    Saw Kenny Reed    MRN: [id number]           Patient Information     Date Of Birth          1975        Visit Information        Provider Department      9/12/2018 1:10 PM Jimena Dumont MD [facility]        Today's Diagnoses     Hypermetropia, unspecified laterality    -  1    Ophthalmalgia, left        Episodic tension-type headache, not intractable          Care Instructions    Saw Derek,    -Referral to Eye Doctor ordered today.  -Please return to clinic if you have a new rash, fever or changes in your vision or if your symptoms worsen or do not resolve in the next 2 weeks.    Dr. Dumont          Follow-ups after your visit        Additional Services     OPHTHALMOLOGY ADULT REFERRAL       Patient to stop at the [employer] Desk    Diagnosis/Reason for Referral: left eye pain     needed: Yes  Language: Cecilia    May leave message on voicemail: No    Referral should be tracked? Yes                  Who to contact     Please call your clinic at [phone] to:    Ask questions about your health    Make or cancel appointments    Discuss your medicines    Learn about your test results    Speak to your doctor            Additional Information About Your Visit        [employer] Information     [employer] gives you secure access to your electronic health record. If you see a primary care provider, you can also send messages to your care team and make appointments. If you have questions, please call your primary care clinic.  If you do not have a primary care provider, please call [phone] and they will assist you.      [employer] is an electronic gateway that provides easy, online access to your medical records. With [employer], you can request a clinic appointment, read your test results, renew a prescription or communicate with your care team.     To access your existing account, please contact your [facility] or call  [phone] for assistance.        Care EveryWhere ID     This is your Care EveryWhere ID. This could be used by other organizations to access your [place] medical records  [id number]        Your Vitals Were     Pulse Temperature Respirations Pulse Oximetry          87 98  F (36.7  C) (Oral) 20 98%         Blood Pressure from Last 3 Encounters:   09/12/18 127/85   04/12/18 129/87   03/22/18 121/83    Weight from Last 3 Encounters:   04/12/18 120 lb (54.4 kg)   03/13/18 125 lb (56.7 kg)   04/20/17 111 lb 9.6 oz (50.6 kg)              We Performed the Following     OPHTHALMOLOGY ADULT REFERRAL          Where to get your medicines      These medications were sent to [facility] - [place], [place] - [address], [place] [address]     Phone:  [phone]     acetaminophen 325 MG tablet          Primary Care Provider Office Phone # Fax #    Scott Bjorn Ellison [phone]       [facility] [address] [place] [address]        Equal Access to Services     ALEKSANDRA KAPADIA : Hadii aad ku hadasho Soomaali, waaxda luqadaha, qaybta kaalmada rodrigo, tona thomson. So [facility] [phone].    ATENCIÓN: Si habla español, tiene a tobias disposición servicios gratuitos de asistencia lingüística. Elva al [phone].    We comply with applicable federal civil rights laws and [place] laws. We do not discriminate on the basis of race, color, national origin, age, disability, sex, sexual orientation, or gender identity.            Thank you!     Thank you for choosing [facility]  for your care. Our goal is always to provide you with excellent care. Hearing back from our patients is one way we can continue to improve our services. Please take a few minutes to complete the written survey that you may receive in the mail after your visit with us. Thank you!             Your Updated Medication List - Protect others around you: Learn how to  safely use, store and throw away your medicines at www.disposemymeds.org.          This list is accurate as of 9/12/18  2:08 PM.  Always use your most recent med list.                   Brand Name Dispense Instructions for use Diagnosis    HYDROcodone-acetaminophen 5-325 MG per tablet    NORCO     Take 1-2 tablets by mouth        * ibuprofen 400 MG tablet    ADVIL/MOTRIN    120 tablet    Take 1 tablet (400 mg) by mouth every 6 hours as needed for moderate pain    Abdominal pain, right lower quadrant       * ibuprofen 400 MG tablet    ADVIL/MOTRIN     Take 400 mg by mouth        order for DME     1 Units    Equipment being ordered: forearm crutches    Flaccid paralysis (H)       order for DME     1 Units    Equipment being ordered: Blood pressure monitor    Elevated BP without diagnosis of hypertension       * PHARBETOL 325 MG tablet   Generic drug:  acetaminophen      Take 1-2 tablets as needed for pain. Do not take this in addition to Vicodin/Norco as that also has tylenol in it.        * acetaminophen 325 MG tablet    TYLENOL    100 tablet    Take 2 tablets (650 mg) by mouth every 4 hours as needed for mild pain    Ophthalmalgia, left       polyethylene glycol powder    MIRALAX    510 g    Take 17 g (1 capful) by mouth daily    Constipation, unspecified constipation type       senna-docusate 8.6-50 MG per tablet    SENOKOT-S;PERICOLACE     Take 1 tablet by mouth        * Notice:  This list has 4 medication(s) that are the same as other medications prescribed for you. Read the directions carefully, and ask your doctor or other care provider to review them with you.

## 2018-09-21 NOTE — PROGRESS NOTES
Preceptor Attestation:   Patient seen, evaluated and discussed with the resident. I have verified the content of the note, which accurately reflects my assessment of the patient and the plan of care.   Supervising Physician:  Yash Pedro MD

## 2018-12-12 ENCOUNTER — OFFICE VISIT (OUTPATIENT)
Dept: FAMILY MEDICINE | Facility: CLINIC | Age: 43
End: 2018-12-12
Payer: COMMERCIAL

## 2018-12-12 VITALS
SYSTOLIC BLOOD PRESSURE: 116 MMHG | OXYGEN SATURATION: 97 % | RESPIRATION RATE: 12 BRPM | TEMPERATURE: 97.4 F | BODY MASS INDEX: 23.41 KG/M2 | WEIGHT: 127.2 LBS | DIASTOLIC BLOOD PRESSURE: 74 MMHG | HEART RATE: 74 BPM

## 2018-12-12 DIAGNOSIS — R44.8 PARESTHESIA OF TONGUE: ICD-10-CM

## 2018-12-12 DIAGNOSIS — Z23 NEED FOR IMMUNIZATION AGAINST INFLUENZA: Primary | ICD-10-CM

## 2018-12-12 DIAGNOSIS — R51.9 NONINTRACTABLE EPISODIC HEADACHE, UNSPECIFIED HEADACHE TYPE: ICD-10-CM

## 2018-12-12 RX ORDER — ACETAMINOPHEN 325 MG/1
650 TABLET ORAL EVERY 4 HOURS PRN
Qty: 100 TABLET | Refills: 0 | Status: SHIPPED | OUTPATIENT
Start: 2018-12-12 | End: 2019-02-22

## 2018-12-12 NOTE — PROGRESS NOTES
Preceptor Attestation:   Patient seen, evaluated and discussed with the resident. I have verified the content of the note, which accurately reflects my assessment of the patient and the plan of care.   Supervising Physician:  Alex Putnam MD

## 2018-12-12 NOTE — PROGRESS NOTES
"  ASSESSMENT AND PLAN      Saw was seen today for headache and other.    Diagnoses and all orders for this visit:      Nonintractable episodic headache, unspecified headache type: Patient's headaches appear to be well controlled with tylenol. They are not frequent and are not disrupting the patient's quality of life at this time. Will continue with conservative measures.   -     acetaminophen (TYLENOL) 325 MG tablet; Take 2 tablets (650 mg) by mouth every 4 hours as needed for mild pain  -      : Sign Language or Oral - 38-52 minutes    Paresthesia of tongue: Patient has had this tingling as recently as September. Cranial exam is otherwise normal at this time which is reassuring. Will continue to monitor for worsening symptoms. No indication for imaging at this time.     Need for immunization against influenza  -     ADMIN VACCINE, INITIAL  -     FLU VAC QUADRIVLENT SPLIT VIRUS IM 0.5ml dosage    Follow-up in one year unless symptoms worsening.     Scott Ellison MD PGY3  [facility]                SUBJECTIVE       Saw Kenny Reed is a 43 year old  male with a PMH significant for:     Patient Active Problem List   Diagnosis     History of poliomyelitis     Flaccid paralysis (H)     History of hepatitis B virus infection conferring immunity     He presents with complaints of numbness in his tongue and tylenol.  Numbness in the left side of the tongue. It has been ongoing lasts for less than a minute. Comes on when he is working for long times on the computer. These symptoms started this year. He does not feel a lot of distress with this. It occurs once a week, or once every other week.     He's having a headache once a month or once every 2 months. \"it's not severe.\" He takes tylenol for it and it resolves. Notes it just on the left side. He describes this as a burning sensation.     No recent fevers, chills, cough, chest pain, or shortness of breath.    PMH, Medications and Allergies were reviewed " and updated as needed.            OBJECTIVE     Vitals:    12/12/18 1125   BP: 116/74   BP Location: Left arm   Patient Position: Sitting   Cuff Size: Adult Regular   Pulse: 74   Resp: 12   Temp: 97.4  F (36.3  C)   TempSrc: Oral   SpO2: 97%   Weight: 57.7 kg (127 lb 3.2 oz)     Body mass index is 23.41 kg/m .    General: Well appearing, NAD, answering questions appropriately  HEENT: Atraumatic, normocephalic. Pharynx clear, mucous membranes moist.   Neck: no lymphadenopathy  CV: S1, S2, RRR. No murmurs noted  RESP: Clear to auscultation bilaterally.   NEURO: CN II-XII intact bilaterally.     No results found for this or any previous visit (from the past 24 hour(s)).

## 2018-12-12 NOTE — NURSING NOTE
Due to patient being non-English speaking/uses sign language, an  was used for this visit. Only for face-to-face interpretation by an external agency, date and length of interpretation can be found on the scanned worksheet.       name: Baldomero Hernández  Language: Cecilia  Agency:  [employer]  Telephone number: [phone]  Type of interpretation:  Face-to-face, spoken    Baldomero Reed      1.  Has the patient received the information for the influenza vaccine? YES    2.  Does the patient have any of the following contraindications?     Allergy to eggs? No     Allergic reaction to previous influenza vaccines? No     Any other problems to previous influenza vaccines? No     Paralyzed by Guillain-[place] syndrome? No     Currently pregnant? NO     Current moderate or severe illness? No    Vaccination given by Amee Ledezma CMA .  Recorded by Amee Ledezma CMA

## 2019-02-22 ENCOUNTER — OFFICE VISIT (OUTPATIENT)
Dept: FAMILY MEDICINE | Facility: CLINIC | Age: 44
End: 2019-02-22
Payer: COMMERCIAL

## 2019-02-22 VITALS
SYSTOLIC BLOOD PRESSURE: 128 MMHG | OXYGEN SATURATION: 98 % | TEMPERATURE: 97.9 F | HEART RATE: 79 BPM | DIASTOLIC BLOOD PRESSURE: 82 MMHG | BODY MASS INDEX: 24.11 KG/M2 | RESPIRATION RATE: 16 BRPM | WEIGHT: 131 LBS

## 2019-02-22 DIAGNOSIS — R51.9 NONINTRACTABLE EPISODIC HEADACHE, UNSPECIFIED HEADACHE TYPE: ICD-10-CM

## 2019-02-22 PROBLEM — K37 APPENDICITIS: Status: ACTIVE | Noted: 2018-03-16

## 2019-02-22 PROBLEM — K35.30 ACUTE APPENDICITIS WITH LOCALIZED PERITONITIS: Status: ACTIVE | Noted: 2019-02-22

## 2019-02-22 RX ORDER — ACETAMINOPHEN 325 MG/1
650 TABLET ORAL EVERY 4 HOURS PRN
Qty: 100 TABLET | Refills: 0 | Status: SHIPPED | OUTPATIENT
Start: 2019-02-22 | End: 2023-09-14

## 2019-02-22 NOTE — NURSING NOTE
Due to patient being non-English speaking/uses sign language, an  was used for this visit. Only for face-to-face interpretation by an external agency, date and length of interpretation can be found on the scanned worksheet.       name: Baldomero Hernández  Language: Cecilia  Agency:  [employer]  Telephone number: [phone]  Type of interpretation:  Face-to-face, spoken

## 2019-02-22 NOTE — PATIENT INSTRUCTIONS
Thank you for coming to [facility] for your care. It was a pleasure to take care of you!    - Great job on taking good care of your health, KEEP IT UP!  - Take Tylenol as needed headaches.   - Continue to drink lots of water.   - Schedule a follow up visit for full physical in one week.     Elle Mcfarland MD

## 2019-02-22 NOTE — PROGRESS NOTES
SUBJECTIVE       Saw Kenny Reed is a 44 year old male with a PMH significant for   Patient Active Problem List   Diagnosis     History of poliomyelitis     Flaccid paralysis (H)     History of hepatitis B virus infection conferring immunity     Acute appendicitis with localized peritonitis     Appendicitis    who presents for evaluation of headache.    Headache  Patient comes in today for follow-up of headache that he had about 2-3 days ago.  At that time, he also reports history of cold symptoms including runny nose, sneezing, congestion.  Denies any cough at that time.  Headache was bitemporal without radiation to his neck.  Denies any other associated symptoms including blurry vision, photophobia, phonophobia, numbness, tingling, weakness that is new.  He does have poliomyelitis and has known flaccid paralysis secondary to this.  Patient denies any recent fevers or chills.  Last time he had his headache was about 2 days ago.  Rated it as being mild 3/10 on the pain scale.  He has been taking Tylenol with good improvement.  Has been drinking lots of water as well.    Disability form  Patient also brings in his form to update disability status today.  This was a form that patient was supposed to feel but needed some help with this.  Patient is currently going to some adult classes learning English and math but is unable to tell me the name of the school.  There was a question on the form that asked about patient's ability to work.  Currently, patient does not feel like he can work as he has his disability from polio.  Did discuss that he would need to see the same provider a few time may be his PCP to establish care.    Patient speaks Cecilia, so an  was used.    Medications and problem list have been reviewed and updated.         REVIEW OF SYSTEMS   General: No fevers, chills  Head: +ve for headaches, no dizziness.   Neck: No swallowing problems   Resp: No cough. Mild congestion.   GI: No  constipation, diarrhea, no nausea or vomiting  Skin: No rash        OBJECTIVE     Vitals:    02/22/19 0811   BP: 128/82   Pulse: 79   Resp: 16   Temp: 97.9  F (36.6  C)   TempSrc: Oral   SpO2: 98%   Weight: 59.4 kg (131 lb)     Body mass index is 24.11 kg/m .  Gen:  In NAD, good color, appears well hydrated  HEENT: No Scleral icterus or conjunctival injection  Neck: supple without lymphadenopathy  CV:  RRR  - no murmurs, age appropriate rate  Pulm:  CTAB, no wheezes/rales/rhonchi, good air entry   Abdomen: Did not assess.  Skin: No rashes or lesions noted.    ASSESSMENT AND PLAN     Saw was seen today for headache.    Diagnoses and all orders for this visit:    Nonintractable episodic headache, unspecified headache type  Patient coming in today for a non-intractable episodic headache, likely related to his recent URI symptoms.  Currently resolved and has had good relief from using Tylenol intermittently.  Recommended that he continue to use this as needed and let us know if there are any changes in his symptoms.  Did recommend that he continue to drink lots of water.  -     acetaminophen (TYLENOL) 325 MG tablet; Take 2 tablets (650 mg) by mouth every 4 hours as needed for mild pain    Disability forms  Patient brought with him forms that were supposed to be filled by patient for updating his disability status to continue getting benefits. Patient is unsure if he is able to work or not with his disabilities Since this is my first time meeting with patient, I did recommend that he try to establish further care with us so that we can get him connected to a provider who can get to know him more.  Has only been seen by his PCP one time.  He will plan to do this in the future.  Did recommend that patient make an appointment for full physical in 1 week.    Options for treatment and/or follow-up care were reviewed with the patient who was engaged and actively involved in the decision making process and verbalized  understanding of the options discussed and was satisfied with the final plan. Risks and benefits of plan were carefully reviewed.     I, Elle Mcfarland, have discussed patient findings with attending physician Raegan Easton MD who was agreeable with plan.     Elle Mcfarland, PGY2

## 2019-02-22 NOTE — PROGRESS NOTES
Preceptor attestation:  Vital signs reviewed: /82   Pulse 79   Temp 97.9  F (36.6  C) (Oral)   Resp 16   Wt 59.4 kg (131 lb)   SpO2 98%   BMI 24.11 kg/m      Patient seen, evaluated, and discussed with the resident.  I have verified the content of the note, which accurately reflects my assessment of the patient and the plan of care.    Supervising physician: Raegan Easton MD  [facility]

## 2019-03-06 ENCOUNTER — OFFICE VISIT (OUTPATIENT)
Dept: FAMILY MEDICINE | Facility: CLINIC | Age: 44
End: 2019-03-06
Payer: COMMERCIAL

## 2019-03-06 VITALS
DIASTOLIC BLOOD PRESSURE: 82 MMHG | OXYGEN SATURATION: 96 % | RESPIRATION RATE: 18 BRPM | SYSTOLIC BLOOD PRESSURE: 127 MMHG | WEIGHT: 132 LBS | TEMPERATURE: 97.5 F | BODY MASS INDEX: 24.29 KG/M2 | HEART RATE: 93 BPM

## 2019-03-06 DIAGNOSIS — Z00.00 ROUTINE GENERAL MEDICAL EXAMINATION AT A HEALTH CARE FACILITY: Primary | ICD-10-CM

## 2019-03-06 LAB
CHOLEST SERPL-MCNC: 237.9 MG/DL (ref 0–200)
CHOLEST/HDLC SERPL: 4.8 {RATIO} (ref 0–5)
HDLC SERPL-MCNC: 49.2 MG/DL
HIV 1+2 AB+HIV1 P24 AG SERPL QL IA: NEGATIVE
LDLC SERPL CALC-MCNC: 166 MG/DL (ref 0–129)
TRIGL SERPL-MCNC: 115.6 MG/DL (ref 0–150)
VLDL CHOLESTEROL: 23.1 MG/DL (ref 7–32)

## 2019-03-06 NOTE — RESULT ENCOUNTER NOTE
Please have  call patient regarding the following:    Hi Saw Win,    The results of your cholesterol testing are back. Your total cholesterol is slightly elevated, but not high enough to need to start you on any additional medications at this time. Based on your cholesterol levels and your other vital signs, your risk for a heart attack or stroke is only 2% over the next 10 years which is very low. If you have any questions, please call the clinic.    Thank you,    Scott Ellison MD PGY3  [facility]

## 2019-03-06 NOTE — PROGRESS NOTES
Preceptor Attestation:   Patient seen, evaluated and discussed with the resident. I have verified the content of the note, which accurately reflects my assessment of the patient and the plan of care.   Supervising Physician:  Brice Ocampo MD

## 2019-03-06 NOTE — NURSING NOTE
Due to patient being non-English speaking/uses sign language, an  was used for this visit. Only for face-to-face interpretation by an external agency, date and length of interpretation can be found on the scanned worksheet.       name: Baldomero Hernández  Language: Cecilia  Agency:  [employer]  Telephone number: [phone]  Type of interpretation:  Face-to-face, spoken

## 2019-03-06 NOTE — PROGRESS NOTES
Male Physical Note      Concerns today: No special concerns today.    A [employer]  was used for  this visit.     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    Social History     Tobacco Use     Smoking status: Never Smoker     Smokeless tobacco: Never Used   Substance Use Topics     Alcohol use: No       Children ? Yes, one 12 year old son.    Has anyone hurt you physically, for example by pushing, hitting, slapping or kicking you or forcing you to have sex? Denies  Do you feel threatened or controlled by a partner, ex-partner or anyone in your life? Denies    RISK BEHAVIORS AND HEALTHY HABITS:  Tobacco Use/Smoking: Details Previously chewed betelnut  Illicit Drug Use: None  ETOH: None  Do you use alcohol? No  Sexually Active: Yes  Diet (5-7 servings of fruits/veg daily): Yes   Exercise (30 min accumulated most days):No  Dental Care: Yes   Calcium 1500 mg/d:  No  Seat Belt Use: Yes     Cholesterol Level (>46 yo or at risk):  Recommended and patient accepted testing. and HIV screening:  Recommended and patient accepted testing.      Immunization History   Administered  Date(s) Administered     Influenza Vaccine, 3 YRS +, IM (QUADRIVALENT W/PRESERVATIVES) 11/21/2017, 12/12/2018     Td (Adult), Adsorbed 04/26/2017     Reviewed Immunization Record Today    EXAMINATION:  /82 (BP Location: Right arm, Patient Position: Sitting, Cuff Size: Adult Regular)   Pulse 93   Temp 97.5  F (36.4  C) (Oral)   Resp 18   Wt 59.9 kg (132 lb)   SpO2 96%   BMI 24.29 kg/m    GENERAL: healthy, alert and no distress  EYES: Eyes grossly normal to inspection, extraocular movements - intact, and PERRL  HENT: ear canals- normal; TMs- normal; Nose- normal; Mouth- no ulcers, no lesions  NECK: no tenderness, no adenopathy, no asymmetry, no masses, no stiffness; thyroid- normal to palpation  RESP: lungs clear to auscultation - no rales, no rhonchi, no wheezes  CV: regular rates and rhythm, normal S1 S2, no S3 or S4 and no murmur, no click or rub   ABDOMEN: soft, no tenderness, no  hepatosplenomegaly, no masses, normal bowel sounds  MS: severe atrophy of right lower extremity, all other extremities have normal muscle mass and 5/5 strength.   SKIN: Signficant scarring of the left face.  NEURO: strength and tone- normal, sensory exam- grossly normal, mentation- intact, speech- normal, reflexes- symmetric  BACK: no CVA tenderness, no paralumbar tenderness  PSYCH: Alert and oriented times 3; speech- coherent , normal rate and volume; able to articulate logical thoughts, able to abstract reason, no tangential thoughts, no hallucinations or delusions, affect- normal  LYMPHATICS: ant. cervical- normal, post. cervical- normal    ASSESSMENT:  1. Health Care Maintenance: Normal Physical Exam  2. 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   2. HIV screening  3. Follow-up in 2 weeks with immunization records, if incomplete, will order titers or vaccinate at that time.

## 2019-04-04 ENCOUNTER — ALLIED HEALTH/NURSE VISIT (OUTPATIENT)
Dept: FAMILY MEDICINE | Facility: CLINIC | Age: 44
End: 2019-04-04
Payer: COMMERCIAL

## 2019-04-04 VITALS
DIASTOLIC BLOOD PRESSURE: 75 MMHG | TEMPERATURE: 97.6 F | RESPIRATION RATE: 16 BRPM | HEART RATE: 81 BPM | OXYGEN SATURATION: 99 % | SYSTOLIC BLOOD PRESSURE: 125 MMHG

## 2019-04-04 DIAGNOSIS — Z23 NEED FOR VACCINATION: Primary | ICD-10-CM

## 2019-04-04 NOTE — NURSING NOTE
Due to patient being non-English speaking/uses sign language, an  was used for this visit. Only for face-to-face interpretation by an external agency, date and length of interpretation can be found on the scanned worksheet.     name: Ignacio Pierre  Agency: [employer]  Language: Cecilia   Telephone number:   Type of interpretation: Face-to-face, spoken

## 2020-01-06 ENCOUNTER — ALLIED HEALTH/NURSE VISIT (OUTPATIENT)
Dept: FAMILY MEDICINE | Facility: CLINIC | Age: 45
End: 2020-01-06
Payer: COMMERCIAL

## 2020-01-06 VITALS — TEMPERATURE: 97.6 F

## 2020-01-06 DIAGNOSIS — Z23 NEED FOR PROPHYLACTIC VACCINATION AND INOCULATION AGAINST INFLUENZA: Primary | ICD-10-CM

## 2020-01-06 NOTE — NURSING NOTE
Chief Complaint   Patient presents with     Flu Shot     FLU VACCINE      Todd Catherine CMA    Due to patient being non-English speaking/uses sign language, an  was used for this visit. Only for face-to-face interpretation by an external agency, date and length of interpretation can be found on the scanned worksheet.     name: Baldomero Hernández  Agency: [employer]  Language: Cecilia   Telephone number: [phone]  Type of interpretation: Face-to-face, spoken     EVENS Archuleta      1.  Has the patient received the information for the influenza vaccine? YES    2.  Does the patient have any of the following contraindications?     Allergy to eggs? No     Allergic reaction to previous influenza vaccines? No     Any other problems to previous influenza vaccines? No     Paralyzed by Guillain-Talala syndrome? No     Currently pregnant? NO     Current moderate or severe illness? No    Vaccination given by Todd Catherine CMA    Recorded by Todd Catherine CMA

## 2020-05-07 ENCOUNTER — TELEPHONE (OUTPATIENT)
Dept: FAMILY MEDICINE | Facility: CLINIC | Age: 45
End: 2020-05-07

## 2020-05-07 NOTE — TELEPHONE ENCOUNTER
Reached out to patient during COVID19 Clinic outreach. Reassured patient that [facility] is still open and has started implementing phone and video appointments to help patient remain safe at home.     Patient reports the following concerns: n/a    Per patient request, patient is scheduled for a visit to address their concerns on the following date: n/a     Offered MyChart. Patient declined. Unable to call.    Called, no answer and sent a letter.     BRYANNA Mckinley        Due to patient being non-English speaking/uses sign language, an  was used for this visit. Only for face-to-face interpretation by an external agency, date and length of interpretation can be found on the scanned worksheet.     name: Kathy Mcintyre  Agency: [employer]  Language: Cecilia   Telephone number: [phone]  Type of interpretation: Telephone, spoken

## 2020-05-07 NOTE — LETTER
May 7, 2020      Saw Kenny Derek  [address]   [place] [address]        Dear Saw,    We tried reaching you by phone but were unable to connect with you. We are reaching out to see how you are doing. This is a very stressful time in the world, which can cause an increase in personal stress and anxiety.     Our clinic is open. We are here for you and are ready to meet all of your healthcare needs. We have delayed preventive care until July. We want everyone who can to stay home during this time for their health and the health of all. We are now having most visits over the phone, but will see people in person if your doctor agrees that it is necessary. We also will have video visits starting on April 6, 2020.      Call us with any questions or concerns you may have, and know that we are all in this together.       Sincerely,     Your team at [facility]  [phone]

## 2020-12-27 ENCOUNTER — HEALTH MAINTENANCE LETTER (OUTPATIENT)
Age: 45
End: 2020-12-27

## 2021-05-30 VITALS — WEIGHT: 112 LBS | BODY MASS INDEX: 20.61 KG/M2 | HEIGHT: 62 IN

## 2021-06-01 VITALS — HEIGHT: 62 IN | BODY MASS INDEX: 23.74 KG/M2 | WEIGHT: 129 LBS

## 2021-06-01 VITALS — WEIGHT: 129 LBS | HEIGHT: 62 IN | BODY MASS INDEX: 23.74 KG/M2

## 2021-06-10 NOTE — PROGRESS NOTES
"S:  Patient seen in clinic today for green card exam and update of immunizations.  There is no past history of immunization reactions.  No recent fevers or shortness of breath.     There is no problem list on file for this patient.      O:  /64 (Patient Site: Right Arm, Patient Position: Sitting, Cuff Size: Adult Regular)  Pulse 72  Temp 98.5  F (36.9  C) (Oral)   Resp 20  Ht 5' 2\" (1.575 m)  Wt 112 lb (50.8 kg)  BMI 20.49 kg/m2  General - alert, NAD  CV - RRR  Resp - lunds clear to auscultation    A/P:  Green Card/update imm.  Counseling done on immunization history and requirements with the patient.  Reviewed available immunization history and relevant lab records with patient and family.  Immunizations given as documented in the EHR and on form.  Acetaminophen as needed for post-immunization fever or myalgias.  [employer] form I-693 completed today with the patient.  Given to patient in a sealed labeled envelope and a copy is being scanned into the EHR.  Please see that form for further details.  Patient directed to follow-up in clinic for routine and preventative care.     "

## 2021-06-16 NOTE — ANESTHESIA PREPROCEDURE EVALUATION
Anesthesia Evaluation      Patient summary reviewed   No history of anesthetic complications     Airway   Mallampati: II  Neck ROM: full   Pulmonary - negative ROS and normal exam    breath sounds clear to auscultation                         Cardiovascular - negative ROS  Rhythm: regular  Rate: normal,         Neuro/Psych      Comments: Weak right leg ?polio    Endo/Other - negative ROS      GI/Hepatic/Renal      Comments: Acute appendicitis     Other findings: Pt ate a few bites of soup and rice at 1430. Discussed w/ Dr Hernández our preference to wait 8 hrs for NPO status. Per Dr Hernández, pt's condition merits doing appendectomy emergently.      Dental    (+) poor dentition                       Anesthesia Plan  Planned anesthetic: general endotracheal  Propofol/levy  Sugammadex reversal  Glidescope  ASA 2   Induction: intravenous   Anesthetic plan and risks discussed with: patient and  services used  Anesthesia plan special considerations: rapid sequence induction, antiemetics,

## 2021-06-16 NOTE — ANESTHESIA POSTPROCEDURE EVALUATION
Patient: Baldomero Reed  APPENDECTOMY, LAPAROSCOPIC  Anesthesia type: general    Patient location: PACU  Last vitals:   Vitals:    03/16/18 2040   BP: 132/82   Pulse: 87   Resp: 15   Temp: 36.7  C (98.1  F)   SpO2: 96%     Post vital signs: stable  Level of consciousness: awake and responds to simple questions  Post-anesthesia pain: pain controlled  Post-anesthesia nausea and vomiting: no  Pulmonary: unassisted, return to baseline  Cardiovascular: stable and blood pressure at baseline  Hydration: adequate  Anesthetic events: no    QCDR Measures:  ASA# 11 - Poppy-op Cardiac Arrest: ASA11B - Patient did NOT experience unanticipated cardiac arrest  ASA# 12 - Poppy-op Mortality Rate: ASA12B - Patient did NOT die  ASA# 13 - PACU Re-Intubation Rate: ASA13B - Patient did NOT require a new airway mgmt  ASA# 10 - Composite Anes Safety: ASA10A - No serious adverse event    Additional Notes:

## 2021-06-16 NOTE — ANESTHESIA CARE TRANSFER NOTE
Last vitals:   Vitals:    03/16/18 2010   BP: 125/75   Pulse: 92   Resp: 18   Temp: 36.3  C (97.3  F)   SpO2: 100%     Patient's level of consciousness is awake  Spontaneous respirations: yes  Maintains airway independently: yes  Dentition unchanged: yes  Oropharynx: oropharynx clear of all foreign objects    QCDR Measures:  ASA# 20 - Surgical Safety Checklist: WHO surgical safety checklist completed prior to induction  PQRS# 430 - Adult PONV Prevention: 4558F - Pt received => 2 anti-emetic agents (different classes) preop & intraop  ASA# 8 - Peds PONV Prevention: NA - Not pediatric patient, not GA or 2 or more risk factors NOT present  PQRS# 424 - Poppy-op Temp Management: 4559F - At least one body temp DOCUMENTED => 35.5C or 95.9F within required timeframe  PQRS# 426 - PACU Transfer Protocol: - Transfer of care checklist used  ASA# 14 - Acute Post-op Pain: ASA14B - Patient did NOT experience pain >= 7 out of 10

## 2021-06-17 NOTE — PROGRESS NOTES
"HPI: Pt is here for follow up of a laparoscopic appendectomy on March 16th, 2018.   he is doing well.  Pain is well controlled.  No difficulties with the surgical wound/wounds.  he is eating well and denies fever and chills.         BP (!) 154/95 (Patient Site: Left Arm, Patient Position: Sitting, Cuff Size: Adult Regular)  Pulse 73  Ht 5' 2\" (1.575 m)  Wt 129 lb (58.5 kg)  SpO2 99%  BMI 23.59 kg/m2    EXAM:  GENERAL:Appears well  ABDOMEN:  Soft, +BS  SURGICAL WOUNDS:  Incisions healing well, no enduration or drainage.        Assessment/Plan: . Doing well after his Lap Appy, and should follow up as needed.    Brice Hernández MD  [facility] Department of Surgery  "

## 2021-10-09 ENCOUNTER — HEALTH MAINTENANCE LETTER (OUTPATIENT)
Age: 46
End: 2021-10-09

## 2022-01-29 ENCOUNTER — HEALTH MAINTENANCE LETTER (OUTPATIENT)
Age: 47
End: 2022-01-29

## 2022-03-28 ENCOUNTER — OFFICE VISIT (OUTPATIENT)
Dept: FAMILY MEDICINE | Facility: CLINIC | Age: 47
End: 2022-03-28
Payer: COMMERCIAL

## 2022-03-28 ENCOUNTER — LAB (OUTPATIENT)
Dept: LAB | Facility: CLINIC | Age: 47
End: 2022-03-28
Payer: COMMERCIAL

## 2022-03-28 VITALS
TEMPERATURE: 97.6 F | HEART RATE: 86 BPM | RESPIRATION RATE: 18 BRPM | OXYGEN SATURATION: 97 % | SYSTOLIC BLOOD PRESSURE: 130 MMHG | DIASTOLIC BLOOD PRESSURE: 86 MMHG

## 2022-03-28 DIAGNOSIS — Z12.11 SCREENING FOR COLON CANCER: ICD-10-CM

## 2022-03-28 DIAGNOSIS — Z00.00 ROUTINE GENERAL MEDICAL EXAMINATION AT A HEALTH CARE FACILITY: ICD-10-CM

## 2022-03-28 DIAGNOSIS — Z86.39 HISTORY OF ELEVATED GLUCOSE: ICD-10-CM

## 2022-03-28 DIAGNOSIS — Z00.00 ROUTINE GENERAL MEDICAL EXAMINATION AT A HEALTH CARE FACILITY: Primary | ICD-10-CM

## 2022-03-28 DIAGNOSIS — Z13.220 SCREENING FOR LIPID DISORDERS: ICD-10-CM

## 2022-03-28 DIAGNOSIS — Z86.12 HISTORY OF POLIOMYELITIS: ICD-10-CM

## 2022-03-28 LAB
CHOLEST SERPL-MCNC: 249 MG/DL
FASTING STATUS PATIENT QL REPORTED: ABNORMAL
HBA1C MFR BLD: 5.2 % (ref 0–5.6)
HCV AB SERPL QL IA: NEGATIVE
HDLC SERPL-MCNC: 45 MG/DL
LDLC SERPL CALC-MCNC: 171 MG/DL
TRIGL SERPL-MCNC: 167 MG/DL

## 2022-03-28 PROCEDURE — 80061 LIPID PANEL: CPT | Performed by: STUDENT IN AN ORGANIZED HEALTH CARE EDUCATION/TRAINING PROGRAM

## 2022-03-28 PROCEDURE — 36415 COLL VENOUS BLD VENIPUNCTURE: CPT | Performed by: STUDENT IN AN ORGANIZED HEALTH CARE EDUCATION/TRAINING PROGRAM

## 2022-03-28 PROCEDURE — 86803 HEPATITIS C AB TEST: CPT | Performed by: STUDENT IN AN ORGANIZED HEALTH CARE EDUCATION/TRAINING PROGRAM

## 2022-03-28 PROCEDURE — 83036 HEMOGLOBIN GLYCOSYLATED A1C: CPT | Performed by: STUDENT IN AN ORGANIZED HEALTH CARE EDUCATION/TRAINING PROGRAM

## 2022-03-28 PROCEDURE — 99386 PREV VISIT NEW AGE 40-64: CPT | Mod: GC | Performed by: STUDENT IN AN ORGANIZED HEALTH CARE EDUCATION/TRAINING PROGRAM

## 2022-03-28 PROCEDURE — 82274 ASSAY TEST FOR BLOOD FECAL: CPT

## 2022-03-28 ASSESSMENT — ENCOUNTER SYMPTOMS
UNEXPECTED WEIGHT CHANGE: 0
ABDOMINAL PAIN: 0
DIFFICULTY URINATING: 0
APPETITE CHANGE: 0
HEMATOCHEZIA: 0

## 2022-03-28 NOTE — NURSING NOTE
Due to patient being non-English speaking/uses sign language, an  was used for this visit. Only for face-to-face interpretation by an external agency, date and length of interpretation can be found on the scanned worksheet.     name: Jonah Whiteside (Htoo)  Agency: [employer]  Language: Cecilia   Telephone number: [phone]  Type of interpretation: Face-to-face, spoken

## 2022-03-28 NOTE — PROGRESS NOTES
"  Male Physical Note      Concerns today: {[facility] CONCERNS:148403}    {:280321}    ROS:                      {ROS normal:474468::\"CONSTITUTIONAL: no fatigue, no unexpected change in weight\",\"SKIN: no worrisome rashes, no worrisome moles, no worrisome lesions\",\"EYES: no acute vision problems or changes\",\"ENT: no ear problems, no mouth problems, no throat problems\",\"RESP: no significant cough, no shortness of breath\",\"CV: no chest pain, no palpitations, no new or worsening peripheral edema\",\"GI: no nausea, no vomiting, no constipation, no diarrhea\"}    Past Medical History:   Diagnosis Date     Poliomyelitis         Family History   Problem Relation Age of Onset     Diabetes No family hx of      Coronary Artery Disease No family hx of      Cancer No family hx of      ***Reviewed no other significant FH           Family History and past Medical History reviewed and unchanged/updated.    Social History     Tobacco Use     Smoking status: Never Smoker     Smokeless tobacco: Never Used   Substance Use Topics     Alcohol use: No     {MARITAL STATUS:220863}  Children ? {NO(DEFAULTED)/YES(STAR):595666}    Has anyone hurt you physically, for example by pushing, hitting, slapping or kicking you or forcing you to have sex? {[facility] DENIES:539970::\"Denies\"}  Do you feel threatened or controlled by a partner, ex-partner or anyone in your life? {[facility] DENIES:023040::\"Denies\"}    RISK BEHAVIORS AND HEALTHY HABITS:  Tobacco Use/Smoking: {[facility] NONE DETAILS:237238::\"None\"}  Illicit Drug Use: {[facility] NONE DETAILS:678726::\"None\"}  ETOH: {[facility] NONE DETAILS:415447::\"None\"}  Do you use alcohol? {[facility] ALCOHOL:439330::\"Yes\",\"Number of drinks per day ***\",\"Number of drinking days a week ***\"}  Sexually Active: {[facility] SEXUALY ACTIVE:377916}  Diet (5-7 servings of fruits/veg daily): {YES / NO:642854::\"Yes\"}   Exercise (30 min accumulated most days):{YES / NO:536718::\"Yes\"}  Dental Care: {YES / NO:328516::\"Yes\"}   Calcium 1500 mg/d:  {YES / " "NO:750682::\"Yes\"}  Seat Belt Use: {YES / NO:471573::\"Yes\"}     {[facility] USPSTF Grade A:003124}  {SMI USPSTF Grade B:571422}  {SMI USPSTF GradeD:757729}    CV Risk based on Pooled Cohort Risk:***   {Pooled cohort Risk Calculator}    Immunization History   Administered Date(s) Administered     COVID-19,PF,Pfizer (12+ Yrs) 06/05/2021, 06/20/2021, 01/12/2022     HepA-Adult 04/04/2019     HepB-Adult 11/20/2013, 01/21/2014, 09/24/2014     Hepatitis B Immunity: Titer 07/13/2015     Influenza Vaccine, 6+MO IM (QUADRIVALENT W/PRESERVATIVES) 10/14/2015, 11/21/2017, 12/12/2018, 01/06/2020, 10/21/2021     MMR 11/20/2013, 01/21/2014     TDAP Vaccine (Boostrix) 05/09/2013     Td (Adult), Adsorbed 04/26/2017     Varicella Immunity: Titer/MD Child 07/13/2015    *** Reviewed Immunization Record Today    EXAMINATION:  /86 (BP Location: Right arm, Patient Position: Sitting, Cuff Size: Adult Regular)   Pulse 86   Temp 97.6  F (36.4  C) (Oral)   Resp 18   SpO2 97%   {EXAM - MALE- zebra stripe:864810::\"GENERAL: healthy, alert and no distress\",\"EYES: Eyes grossly normal to inspection, extraocular movements - intact, and PERRL\",\"HENT: ear canals- normal; TMs- normal; Nose- normal; Mouth- no ulcers, no lesions\",\"NECK: no tenderness, no adenopathy, no asymmetry, no masses, no stiffness; thyroid- normal to palpation\",\"RESP: lungs clear to auscultation - no rales, no rhonchi, no wheezes\",\"BREAST: no masses, no tenderness, no nipple discharge, no palpable axillary masses or adenopathy\",\"CV: regular rates and rhythm, normal S1 S2, no S3 or S4 and no murmur, no click or rub -\",\"ABDOMEN: soft, no tenderness, no  hepatosplenomegaly, no masses, normal bowel sounds\",\"MS: extremities- no gross deformities noted, no edema\",\"SKIN: no suspicious lesions, no rashes\",\"NEURO: strength and tone- normal, sensory exam- grossly normal, mentation- intact, speech- normal, reflexes- symmetric\",\"BACK: no CVA tenderness, no paralumbar tenderness\",\"- male: " "testicles- normal, no atrophy, no masses;  no inguinal hernias\",\"RECTAL- male: no masses, no hemorhoids, Prostate- symmetric, no  nodularity, no masses, no hypertrophy\",\"PSYCH: Alert and oriented times 3; speech- coherent , normal rate and volume; able to articulate logical thoughts, able to abstract reason, no tangential thoughts, no hallucinations or delusions, affect- normal\",\"LYMPHATICS: ant. cervical- normal, post. cervical- normal, axillary- normal, supraclavicular- normal, inguinal- normal\"}    ASSESSMENT:  {[facility] ASSESSMENT:614183::\"1. Health Care Maintenance: Normal Physical Exam\"}    PLAN:  1.{[facility] MALE PLAN:909847}  2. ***  3. ***  4. ***    {[facility] PX COMMUNICATION:732141}  "

## 2022-03-28 NOTE — PROGRESS NOTES
DME (Durable Medical Equipment) Orders and Documentation  Orders Placed This Encounter   Procedures     Crutches Order      The patient was assessed and it was determined the patient is in need of the following listed DME Supplies/Equipment. Please complete supporting documentation below to demonstrate medical necessity.      DME All Other Item(s) Documentation    List reason for need and supporting documentation for medical necessity below for each DME item.     1. History of polio      Male Physical Note      Concerns today: No special concerns today. Needs new order for forearm crutches as his have a loose piece that is sharp.     A [employer]  was used for this visit.     ROS:                      CONSTITUTIONAL: No fatigue, no unexpected change in weight  SKIN: No worrisome moles or other lesions  EYES: Positive for occasional eye discomfort when looking at computer screens, sees eye doctor annually. No acute vision problems or changes  ENT: No ear problems, no mouth problems, no throat problems  RESP: No significant cough, no shortness of breath  CV: No chest pain  GI: No constipation, no diarrhea, no blood in stools  : No frequency, no dysuria, no hematuria, no difficulty maintaining erections  NEURO: No weakness, no dizziness, no headaches  MSK: Ambulates well with forearm crutches, no pain    Past Medical History:   Diagnosis Date     Poliomyelitis         Family History   Problem Relation Age of Onset     Diabetes No family hx of      Coronary Artery Disease No family hx of      Cancer No family hx of     Reviewed no other significant FH           Family History and past Medical History reviewed and unchanged/updated.    Social History     Tobacco Use     Smoking status: Never Smoker     Smokeless tobacco: Never Used   Substance Use Topics     Alcohol use: No       Children ? yes 1 child    Has anyone hurt you physically, for example by pushing, hitting, slapping or kicking you or forcing  you to have sex? Denies  Do you feel threatened or controlled by a partner, ex-partner or anyone in your life? Denies    RISK BEHAVIORS AND HEALTHY HABITS:  Tobacco Use/Smoking: None  Illicit Drug Use: None  ETOH: None  Do you use alcohol? No  Sexually Active: Yes  Diet (5-7 servings of fruits/veg daily): Yes   Exercise (30 min accumulated most days):Yes  Dental Care: Has not seen since start of COVID-19, has regular dentist he goes to and will follow up   Calcium 1500 mg/d:  Yes  Seat Belt Use: Yes     Cholesterol Level (>44 yo or at risk):  Recommended and patient accepted testing.  Colon CA Screening (>50-75 ):  Recommended and patient accepted testing.      Immunization History   Administered Date(s) Administered     COVID-19,ZAIRAPfizer (12+ Yrs) 06/05/2021, 06/20/2021, 01/12/2022     HepA-Adult 04/04/2019     HepB-Adult 11/20/2013, 01/21/2014, 09/24/2014     Hepatitis B Immunity: Titer 07/13/2015     Influenza Vaccine, 6+MO IM (QUADRIVALENT W/PRESERVATIVES) 10/14/2015, 11/21/2017, 12/12/2018, 01/06/2020, 10/21/2021     MMR 11/20/2013, 01/21/2014     TDAP Vaccine (Boostrix) 05/09/2013     Td (Adult), Adsorbed 04/26/2017     Varicella Immunity: Titer/MD Child 07/13/2015     Reviewed Immunization Record Today    EXAMINATION:  /86 (BP Location: Right arm, Patient Position: Sitting, Cuff Size: Adult Regular)   Pulse 86   Temp 97.6  F (36.4  C) (Oral)   Resp 18   SpO2 97%   GENERAL: healthy, alert and no distress  EYES: Eyes grossly normal to inspection, extraocular movements intact, PERRL  HENT: ear canals normal bilaterally; TMs normal bilaterally; Mouth with no ulcers, no lesions  NECK: no tenderness, no asymmetry; thyroid normal to palpation  RESP: lungs clear to auscultation bilaterally, no rales, no rhonchi, no wheezes  CV: regular rates and rhythm, normal S1 S2, and no murmur appreciated  ABDOMEN: soft, no tenderness, no distention  MS: 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 Routine general medical examination at a health care facility  2. Screening for colon cancer  3. Screening for lipid disorders  4. History of elevated glucose  Overall healthy adult male. Will obtain routine health maintenance as below. Encouraged follow up with dentist and eye doctor regularly.   - Fecal colorectal cancer screen FIT; Future  - Hemoglobin A1c  - Hepatitis C antibody  - Lipid Profile    5. History of poliomyelitis  Polio at age 4. Hypoplastic right lower extremity, uses forearm crutches to ambulate. New DME faxed for forearm crutches as his need replacing.   - Crutches Order      The 10-year ASCVD risk score (Bianca MUSE Jr., et al., 2013) is: 3.5%    Values used to calculate the score:      Age: 47 years      Sex: Male      Is Non- : No      Diabetic: No      Tobacco smoker: No      Systolic Blood Pressure: 130 mmHg      Is BP treated: No      HDL Cholesterol: 49.2 mg/dL      Total Cholesterol: 237.9 mg/dL      Discussed with Dr. Easton.     Jannet Herrera, MS3  [employer]    I was present with the medical student who participated in the service and in the documentation of this note. I have verified the history and personally performed the physical exam and medical decision making, and have verified the content of the note, which accurately reflects my assessment of the patient and the plan of care.    Radha Hughes MD PGY3

## 2022-03-28 NOTE — PROGRESS NOTES
Preceptor attestation:  Vital signs reviewed: /86 (BP Location: Right arm, Patient Position: Sitting, Cuff Size: Adult Regular)   Pulse 86   Temp 97.6  F (36.4  C) (Oral)   Resp 18   SpO2 97%     Patient seen, evaluated, and discussed with the resident.  I have verified the content of the note, which accurately reflects my assessment of the patient and the plan of care.    Supervising physician: Raegan Easton MD  [facility]

## 2022-03-28 NOTE — PROGRESS NOTES
{PROVIDER CHARTING PREFERENCE:096344}    Subjective   Saw is a 47 year old who presents for the following health issues {ACCOMPANIED BY STATEMENT (Optional):581427}    Needs replacement for crutch.  Feeling well overall. Dx with polio around age 4, does not have pain and ambulates well with forearm crutches.       Tobacco use : no  Alcohol use: no  Diet: rice, meat, veggies, soup  Exercise: yes  Dentist: has not seen since COVID 19 started, no teeth concerns; has established dentist    Left eye bothers him, not blurry, does not lose vision. Feels tingling on the left side of his lips. Helped by ibuprofen. Occurs once a month when looking at the computer for too long. Saw the eye doctor, vision is okay.     OK with lipids, a1c, hep c testing  OK with fecal blood test for colon cancer    {SUPERLIST (Optional):466909}  {additonal problems for provider to add (Optional):253903}    Review of Systems   Constitutional: Negative for appetite change and unexpected weight change.   Gastrointestinal: Negative for abdominal pain and hematochezia.   Genitourinary: Negative for difficulty urinating.      {ROS COMP (Optional):567748}      Objective    /86 (BP Location: Right arm, Patient Position: Sitting, Cuff Size: Adult Regular)   Pulse 86   Temp 97.6  F (36.4  C) (Oral)   Resp 18   SpO2 97%   There is no height or weight on file to calculate BMI.  Physical Exam   GENERAL: healthy, alert and no distress  NECK: no adenopathy, no asymmetry, masses, or scars and thyroid normal to palpation  RESP: lungs clear to auscultation - no rales, rhonchi or wheezes  CV: regular rate and rhythm, normal S1 S2, no S3 or S4, no murmur, click or rub, no peripheral edema and peripheral pulses strong  ABDOMEN: soft, nontender, no hepatosplenomegaly, no masses and bowel sounds normal  MS: no gross musculoskeletal defects noted, no edema  NEURO: cranial nerves intact, no neurologic deficits  SKIN: Skin tags present on neck.    {Diagnostic  Test Results (Optional):216036}    {AMBULATORY ATTESTATION (Optional):649473}

## 2022-03-28 NOTE — RESULT ENCOUNTER NOTE
Hello,    Please call the following patient with the results below. Thank you!    Krishna Saw,    I hope you're well. I wanted to communicate with you the results of the tests that we did.     The laboratory results show NO signs of diabetes, but your cholesterol is elevated. Continue to work on diet and lifestyle modifications. Please let me know if you have any other questions or concerns.     Thank you!  Radha Hughes MD PGY3

## 2022-04-01 LAB — HEMOCCULT STL QL IA: NEGATIVE

## 2022-04-01 NOTE — RESULT ENCOUNTER NOTE
Hello,    Please call the following patient with the results below. Thank you!    Krishna Saw,    I hope you're well. I wanted to communicate with you the results of the tests that we did.     The laboratory results show negative colon cancer screening test. We do not need to discuss screening again until next year. Please let me know if you have any other questions or concerns.     Thank you!  Radha Hughes MD PGY3

## 2022-09-11 ENCOUNTER — HEALTH MAINTENANCE LETTER (OUTPATIENT)
Age: 47
End: 2022-09-11

## 2022-10-21 ENCOUNTER — ALLIED HEALTH/NURSE VISIT (OUTPATIENT)
Dept: FAMILY MEDICINE | Facility: CLINIC | Age: 47
End: 2022-10-21
Payer: COMMERCIAL

## 2022-10-21 VITALS — TEMPERATURE: 97.5 F

## 2022-10-21 DIAGNOSIS — Z23 NEED FOR VACCINATION: Primary | ICD-10-CM

## 2022-10-21 PROCEDURE — 90686 IIV4 VACC NO PRSV 0.5 ML IM: CPT

## 2022-10-21 PROCEDURE — 90471 IMMUNIZATION ADMIN: CPT

## 2023-05-06 ENCOUNTER — HEALTH MAINTENANCE LETTER (OUTPATIENT)
Age: 48
End: 2023-05-06

## 2023-09-14 ENCOUNTER — OFFICE VISIT (OUTPATIENT)
Dept: FAMILY MEDICINE | Facility: CLINIC | Age: 48
End: 2023-09-14
Payer: COMMERCIAL

## 2023-09-14 VITALS
SYSTOLIC BLOOD PRESSURE: 129 MMHG | TEMPERATURE: 97.7 F | RESPIRATION RATE: 18 BRPM | DIASTOLIC BLOOD PRESSURE: 82 MMHG | OXYGEN SATURATION: 98 % | HEART RATE: 81 BPM

## 2023-09-14 DIAGNOSIS — R51.9 NONINTRACTABLE EPISODIC HEADACHE, UNSPECIFIED HEADACHE TYPE: ICD-10-CM

## 2023-09-14 DIAGNOSIS — Z00.00 ROUTINE GENERAL MEDICAL EXAMINATION AT A HEALTH CARE FACILITY: Primary | ICD-10-CM

## 2023-09-14 DIAGNOSIS — B35.6 TINEA CRURIS: ICD-10-CM

## 2023-09-14 LAB
KOH PREPARATION: ABNORMAL
KOH PREPARATION: ABNORMAL

## 2023-09-14 PROCEDURE — 90686 IIV4 VACC NO PRSV 0.5 ML IM: CPT

## 2023-09-14 PROCEDURE — 87220 TISSUE EXAM FOR FUNGI: CPT

## 2023-09-14 PROCEDURE — 90471 IMMUNIZATION ADMIN: CPT

## 2023-09-14 PROCEDURE — 99396 PREV VISIT EST AGE 40-64: CPT | Mod: 25

## 2023-09-14 RX ORDER — ACETAMINOPHEN 325 MG/1
650 TABLET ORAL EVERY 4 HOURS PRN
Qty: 100 TABLET | Refills: 0 | Status: SHIPPED | OUTPATIENT
Start: 2023-09-14

## 2023-09-14 RX ORDER — CLOTRIMAZOLE 1 %
CREAM (GRAM) TOPICAL 2 TIMES DAILY
Qty: 40 G | Refills: 0 | Status: SHIPPED | OUTPATIENT
Start: 2023-09-14 | End: 2023-10-05

## 2023-09-14 ASSESSMENT — ENCOUNTER SYMPTOMS
MYALGIAS: 0
WEAKNESS: 0
NAUSEA: 0
JOINT SWELLING: 0
HEMATOCHEZIA: 0
HEARTBURN: 0
EYE PAIN: 0
FEVER: 0
HEMATURIA: 0
COUGH: 0
DIZZINESS: 0
ARTHRALGIAS: 0
FREQUENCY: 0
HEADACHES: 0
CONSTIPATION: 0
PALPITATIONS: 0
DYSURIA: 0
SHORTNESS OF BREATH: 0
DIARRHEA: 0
ABDOMINAL PAIN: 0
CHILLS: 0
SORE THROAT: 0
PARESTHESIAS: 0
NERVOUS/ANXIOUS: 0

## 2023-09-14 NOTE — NURSING NOTE
Prior to immunization administration, verified patients identity using patient s name and date of birth. Please see Immunization Activity for additional information.     Screening Questionnaire for Adult Immunization    Are you sick today?   No   Do you have allergies to medications, food, a vaccine component or latex?   No   Have you ever had a serious reaction after receiving a vaccination?   No   Do you have a long-term health problem with heart, lung, kidney, or metabolic disease (e.g., diabetes), asthma, a blood disorder, no spleen, complement component deficiency, a cochlear implant, or a spinal fluid leak?  Are you on long-term aspirin therapy?   No   Do you have cancer, leukemia, HIV/AIDS, or any other immune system problem?   No   Do you have a parent, brother, or sister with an immune system problem?   No   In the past 3 months, have you taken medications that affect  your immune system, such as prednisone, other steroids, or anticancer drugs; drugs for the treatment of rheumatoid arthritis, Crohn s disease, or psoriasis; or have you had radiation treatments?   No   Have you had a seizure, or a brain or other nervous system problem?   No   During the past year, have you received a transfusion of blood or blood    products, or been given immune (gamma) globulin or antiviral drug?   No   For women: Are you pregnant or is there a chance you could become       pregnant during the next month?   No   Have you received any vaccinations in the past 4 weeks?   No     Immunization questionnaire answers were all negative.      Patient instructed to remain in clinic for 15 minutes afterwards, and to report any adverse reactions.     Screening performed by Vanna Baldwin MA on 9/14/2023 at 10:51 AM.

## 2023-09-14 NOTE — PROGRESS NOTES
SUBJECTIVE:   CC: Saw is an 48 year old who presents for preventative health visit.       9/14/2023    10:02 AM   Additional Questions   Roomed by fawn   Accompanied by self       Healthy Habits:     Getting at least 3 servings of Calcium per day:  Yes    Bi-annual eye exam:  Yes    Dental care twice a year:  Yes    Sleep apnea or symptoms of sleep apnea:  None    Diet:  Regular (no restrictions)    Frequency of exercise:  6-7 days/week    Duration of exercise:  15-30 minutes    Taking medications regularly:  Not Applicable    Medication side effects:  Not applicable    Additional concerns today:  No    Saw is a 48 year old male here for a preventive care visit. He is doing well today. He has had a rash on the right inner thigh for the last week that is not painful or purulent but itchy. He has been washing it with soap regularly and it has gotten a little better. He is worried it might be ringworm. He has had a similar rash on his left forearm and he was prescribed a topical cream and it got better.    He would like tylenol refill for cold and headaches.    He declined FIT testing today and was interested in flu vaccine.    Social History     Tobacco Use    Smoking status: Never     Passive exposure: Never    Smokeless tobacco: Never   Substance Use Topics    Alcohol use: No           9/14/2023    10:07 AM   Alcohol Use   Prescreen: >3 drinks/day or >7 drinks/week? Not Applicable     Last PSA: No results found for: PSA    Reviewed orders with patient. Reviewed health maintenance and updated orders accordingly - Yes    Reviewed and updated as needed this visit by clinical staff   Tobacco  Allergies  Meds              Reviewed and updated as needed this visit by Provider                   Review of Systems   Constitutional:  Negative for chills and fever.   HENT:  Negative for congestion, ear pain, hearing loss and sore throat.    Eyes:  Negative for pain and visual disturbance.   Respiratory:  Negative for cough  and shortness of breath.    Cardiovascular:  Negative for chest pain, palpitations and peripheral edema.   Gastrointestinal:  Negative for abdominal pain, constipation, diarrhea, heartburn, hematochezia and nausea.   Genitourinary:  Negative for dysuria, frequency, genital sores, hematuria, impotence, penile discharge and urgency.   Musculoskeletal:  Negative for arthralgias, joint swelling and myalgias.   Skin:  Positive for rash.   Neurological:  Negative for dizziness, weakness, headaches and paresthesias.   Psychiatric/Behavioral:  Negative for mood changes. The patient is not nervous/anxious.      CONSTITUTIONAL: NEGATIVE for fever, chills, change in weight  INTEGUMENTARY/SKIN: rash right groin  EYES: NEGATIVE for vision changes or irritation  ENT: NEGATIVE for ear, mouth and throat problems  RESP: NEGATIVE for significant cough or SOB  CV: NEGATIVE for chest pain, palpitations or peripheral edema  GI: NEGATIVE for nausea, abdominal pain, heartburn, or change in bowel habits   male: negative for dysuria, hematuria, decreased urinary stream, erectile dysfunction, urethral discharge  MUSCULOSKELETAL: NEGATIVE for significant arthralgias or myalgia  NEURO: NEGATIVE for weakness, dizziness or paresthesias  PSYCHIATRIC: NEGATIVE for changes in mood or affect    OBJECTIVE:   /82 (BP Location: Right arm, Patient Position: Sitting, Cuff Size: Adult Regular)   Pulse 81   Temp 97.7  F (36.5  C) (Oral)   Resp 18   SpO2 98%     Physical Exam  GENERAL: healthy, alert and no distress  EYES: Eyes grossly normal to inspection, PERRL and conjunctivae and sclerae normal  HENT: ear canals and TM's normal, nose and mouth without ulcers or lesions  NECK: no adenopathy, no asymmetry, masses, or scars and thyroid normal to palpation  RESP: lungs clear to auscultation - no rales, rhonchi or wheezes  CV: regular rate and rhythm, normal S1 S2, no S3 or S4, no murmur, click or rub, no peripheral edema and peripheral pulses  strong  ABDOMEN: soft, nontender, no hepatosplenomegaly, no masses and bowel sounds normal  MS: no gross musculoskeletal defects noted, no edema  SKIN: hyperpigmentation with raised skin and dry, flaky skin in right groin  NEURO: Normal strength and tone, mentation intact and speech normal  PSYCH: mentation appears normal, affect normal/bright    Diagnostic Test Results:  Labs reviewed in Epic  Results for orders placed or performed in visit on 09/14/23 (from the past 24 hour(s))   KOH Preparation    Specimen: Thigh, Right; Skin   Result Value Ref Range    KOH Preparation Fungal elements seen (A)     KOH Preparation Reference Range: No fungal elements seen. (A)        ASSESSMENT/PLAN:     Patient is a 48 year old male here for a preventive care visit.      ICD-10-CM    1. Routine general medical examination at a health care facility  Z00.00 Lipid panel reflex to direct LDL Fasting      2. Tinea cruris  B35.6 KOH Preparation     clotrimazole (LOTRIMIN) 1 % external cream      3. Nonintractable episodic headache, unspecified headache type  R51.9 acetaminophen (TYLENOL) 325 MG tablet        Tinea cruris  Patient has had a rash on the right inner thigh for the last week that is not painful or purulent but itchy. He has been washing it with soap regularly and it has gotten a little better. He is worried it might be ringworm. Physical exam is positive for hyperpigmentation with raised skin and dry, flaky skin in right groin. KOH preparation showed fungal elements. Patient was instructed on keeping the groin area clean and dry. Plan will be apply topical clotrimazole to affected area twice daily for 3 weeks, and follow up if symptoms do not improve.     COUNSELING:   Reviewed preventive health counseling, as reflected in patient instructions      He reports that he has never smoked. He has never been exposed to tobacco smoke. He has never used smokeless tobacco.    Axel Asencio, MS3    I have seen and examined the patient.  I  have discussed the case with Student Axel Asencio.  I agree with the findings, assessment and plan.     Wander Bolivar MD PGY3  [facility]  September 14, 2023

## 2024-09-30 ENCOUNTER — OFFICE VISIT (OUTPATIENT)
Dept: FAMILY MEDICINE | Facility: CLINIC | Age: 49
End: 2024-09-30
Payer: COMMERCIAL

## 2024-09-30 VITALS
HEART RATE: 89 BPM | SYSTOLIC BLOOD PRESSURE: 119 MMHG | DIASTOLIC BLOOD PRESSURE: 81 MMHG | RESPIRATION RATE: 18 BRPM | OXYGEN SATURATION: 98 % | TEMPERATURE: 98.9 F

## 2024-09-30 DIAGNOSIS — Z00.01 ENCOUNTER FOR ROUTINE ADULT MEDICAL EXAM WITH ABNORMAL FINDINGS: Primary | ICD-10-CM

## 2024-09-30 DIAGNOSIS — H04.123 DRY EYES: ICD-10-CM

## 2024-09-30 DIAGNOSIS — B35.6 TINEA CRURIS: ICD-10-CM

## 2024-09-30 DIAGNOSIS — M54.50 ACUTE LEFT-SIDED LOW BACK PAIN WITHOUT SCIATICA: ICD-10-CM

## 2024-09-30 DIAGNOSIS — Z12.11 SCREEN FOR COLON CANCER: ICD-10-CM

## 2024-09-30 PROCEDURE — 99396 PREV VISIT EST AGE 40-64: CPT | Mod: 25

## 2024-09-30 PROCEDURE — 90656 IIV3 VACC NO PRSV 0.5 ML IM: CPT

## 2024-09-30 PROCEDURE — 90471 IMMUNIZATION ADMIN: CPT

## 2024-09-30 RX ORDER — IBUPROFEN 200 MG
400-600 TABLET ORAL EVERY 6 HOURS PRN
Qty: 90 TABLET | Refills: 1 | Status: SHIPPED | OUTPATIENT
Start: 2024-09-30

## 2024-09-30 RX ORDER — CLOTRIMAZOLE 1 %
CREAM (GRAM) TOPICAL 2 TIMES DAILY
Qty: 113 G | Refills: 0 | Status: SHIPPED | OUTPATIENT
Start: 2024-09-30

## 2024-09-30 RX ORDER — POLYETHYLENE GLYCOL 400 AND PROPYLENE GLYCOL 4; 3 MG/ML; MG/ML
1 SOLUTION/ DROPS OPHTHALMIC 4 TIMES DAILY PRN
COMMUNITY
Start: 2024-08-11 | End: 2024-09-30

## 2024-09-30 RX ORDER — POLYETHYLENE GLYCOL 400 AND PROPYLENE GLYCOL 4; 3 MG/ML; MG/ML
1 SOLUTION/ DROPS OPHTHALMIC 4 TIMES DAILY PRN
Qty: 30 ML | Refills: 3 | Status: SHIPPED | OUTPATIENT
Start: 2024-09-30

## 2024-09-30 SDOH — HEALTH STABILITY: PHYSICAL HEALTH: ON AVERAGE, HOW MANY DAYS PER WEEK DO YOU ENGAGE IN MODERATE TO STRENUOUS EXERCISE (LIKE A BRISK WALK)?: 6 DAYS

## 2024-09-30 SDOH — HEALTH STABILITY: PHYSICAL HEALTH: ON AVERAGE, HOW MANY MINUTES DO YOU ENGAGE IN EXERCISE AT THIS LEVEL?: 30 MIN

## 2024-09-30 ASSESSMENT — SOCIAL DETERMINANTS OF HEALTH (SDOH): HOW OFTEN DO YOU GET TOGETHER WITH FRIENDS OR RELATIVES?: TWICE A WEEK

## 2024-09-30 NOTE — PROGRESS NOTES
Preceptor attestation:  Vital signs reviewed: /81 (BP Location: Right arm, Patient Position: Sitting, Cuff Size: Adult Regular)   Pulse 89   Temp 98.9  F (37.2  C) (Oral)   Resp 18   SpO2 98%     Patient seen, evaluated, and discussed with the resident.  I verified the content of the note, which accurately reflects my assessment of the patient and the plan of care.    Supervising physician: Raegan Easton MD  [facility]
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
## 2025-09-01 ENCOUNTER — PATIENT OUTREACH (OUTPATIENT)
Dept: CARE COORDINATION | Facility: CLINIC | Age: 50
End: 2025-09-01
Payer: COMMERCIAL